# Patient Record
Sex: MALE | Race: WHITE | NOT HISPANIC OR LATINO | Employment: FULL TIME | URBAN - METROPOLITAN AREA
[De-identification: names, ages, dates, MRNs, and addresses within clinical notes are randomized per-mention and may not be internally consistent; named-entity substitution may affect disease eponyms.]

---

## 2017-06-29 ENCOUNTER — APPOINTMENT (OUTPATIENT)
Dept: PREADMISSION TESTING | Facility: HOSPITAL | Age: 62
End: 2017-06-29

## 2017-06-29 ENCOUNTER — HOSPITAL ENCOUNTER (OUTPATIENT)
Dept: GENERAL RADIOLOGY | Facility: HOSPITAL | Age: 62
Discharge: HOME | End: 2017-06-29
Admitting: ORTHOPAEDIC SURGERY

## 2017-06-29 ENCOUNTER — HOSPITAL ENCOUNTER (OUTPATIENT)
Dept: GENERAL RADIOLOGY | Facility: HOSPITAL | Age: 62
Discharge: HOME | End: 2017-06-29

## 2017-06-29 VITALS
HEIGHT: 71 IN | DIASTOLIC BLOOD PRESSURE: 88 MMHG | SYSTOLIC BLOOD PRESSURE: 141 MMHG | BODY MASS INDEX: 34.72 KG/M2 | WEIGHT: 248 LBS | OXYGEN SATURATION: 96 % | HEART RATE: 67 BPM | RESPIRATION RATE: 16 BRPM | TEMPERATURE: 97.2 F

## 2017-06-29 LAB
ALBUMIN SERPL-MCNC: 4.5 G/DL (ref 3.5–5.2)
ALBUMIN/GLOB SERPL: 1.5 G/DL
ALP SERPL-CCNC: 51 U/L (ref 39–117)
ALT SERPL W P-5'-P-CCNC: 52 U/L (ref 1–41)
ANION GAP SERPL CALCULATED.3IONS-SCNC: 13.3 MMOL/L
APTT PPP: 27.4 SECONDS (ref 22.7–35.4)
AST SERPL-CCNC: 36 U/L (ref 1–40)
BASOPHILS # BLD AUTO: 0.03 10*3/MM3 (ref 0–0.2)
BASOPHILS NFR BLD AUTO: 0.5 % (ref 0–1.5)
BILIRUB SERPL-MCNC: 0.4 MG/DL (ref 0.1–1.2)
BILIRUB UR QL STRIP: NEGATIVE
BUN BLD-MCNC: 16 MG/DL (ref 6–20)
BUN/CREAT SERPL: 17 (ref 7–25)
CALCIUM SPEC-SCNC: 10.5 MG/DL (ref 8.6–10.5)
CHLORIDE SERPL-SCNC: 101 MMOL/L (ref 98–107)
CLARITY UR: CLEAR
CO2 SERPL-SCNC: 25.7 MMOL/L (ref 22–29)
COLOR UR: YELLOW
CREAT BLD-MCNC: 0.94 MG/DL (ref 0.76–1.27)
DEPRECATED RDW RBC AUTO: 45.9 FL (ref 37–54)
EOSINOPHIL # BLD AUTO: 0.11 10*3/MM3 (ref 0–0.7)
EOSINOPHIL NFR BLD AUTO: 1.8 % (ref 0.3–6.2)
ERYTHROCYTE [DISTWIDTH] IN BLOOD BY AUTOMATED COUNT: 13.6 % (ref 11.5–14.5)
GFR SERPL CREATININE-BSD FRML MDRD: 82 ML/MIN/1.73
GLOBULIN UR ELPH-MCNC: 3 GM/DL
GLUCOSE BLD-MCNC: 105 MG/DL (ref 65–99)
GLUCOSE UR STRIP-MCNC: NEGATIVE MG/DL
HCT VFR BLD AUTO: 42.1 % (ref 40.4–52.2)
HGB BLD-MCNC: 14.2 G/DL (ref 13.7–17.6)
HGB UR QL STRIP.AUTO: NEGATIVE
IMM GRANULOCYTES # BLD: 0.02 10*3/MM3 (ref 0–0.03)
IMM GRANULOCYTES NFR BLD: 0.3 % (ref 0–0.5)
INR PPP: 0.94 (ref 0.9–1.1)
KETONES UR QL STRIP: NEGATIVE
LEUKOCYTE ESTERASE UR QL STRIP.AUTO: NEGATIVE
LYMPHOCYTES # BLD AUTO: 1.59 10*3/MM3 (ref 0.9–4.8)
LYMPHOCYTES NFR BLD AUTO: 26.4 % (ref 19.6–45.3)
MCH RBC QN AUTO: 31.8 PG (ref 27–32.7)
MCHC RBC AUTO-ENTMCNC: 33.7 G/DL (ref 32.6–36.4)
MCV RBC AUTO: 94.2 FL (ref 79.8–96.2)
MONOCYTES # BLD AUTO: 0.65 10*3/MM3 (ref 0.2–1.2)
MONOCYTES NFR BLD AUTO: 10.8 % (ref 5–12)
NEUTROPHILS # BLD AUTO: 3.62 10*3/MM3 (ref 1.9–8.1)
NEUTROPHILS NFR BLD AUTO: 60.2 % (ref 42.7–76)
NITRITE UR QL STRIP: NEGATIVE
PH UR STRIP.AUTO: 7 [PH] (ref 5–8)
PLATELET # BLD AUTO: 233 10*3/MM3 (ref 140–500)
PMV BLD AUTO: 9.5 FL (ref 6–12)
POTASSIUM BLD-SCNC: 4.3 MMOL/L (ref 3.5–5.2)
PROT SERPL-MCNC: 7.5 G/DL (ref 6–8.5)
PROT UR QL STRIP: NEGATIVE
PROTHROMBIN TIME: 12.2 SECONDS (ref 11.7–14.2)
RBC # BLD AUTO: 4.47 10*6/MM3 (ref 4.6–6)
SODIUM BLD-SCNC: 140 MMOL/L (ref 136–145)
SP GR UR STRIP: 1.01 (ref 1–1.03)
UROBILINOGEN UR QL STRIP: NORMAL
WBC NRBC COR # BLD: 6.02 10*3/MM3 (ref 4.5–10.7)

## 2017-06-29 PROCEDURE — 93010 ELECTROCARDIOGRAM REPORT: CPT | Performed by: INTERNAL MEDICINE

## 2017-06-29 PROCEDURE — 85610 PROTHROMBIN TIME: CPT | Performed by: ORTHOPAEDIC SURGERY

## 2017-06-29 PROCEDURE — 73560 X-RAY EXAM OF KNEE 1 OR 2: CPT

## 2017-06-29 PROCEDURE — 85730 THROMBOPLASTIN TIME PARTIAL: CPT | Performed by: ORTHOPAEDIC SURGERY

## 2017-06-29 PROCEDURE — 85025 COMPLETE CBC W/AUTO DIFF WBC: CPT | Performed by: ORTHOPAEDIC SURGERY

## 2017-06-29 PROCEDURE — 80053 COMPREHEN METABOLIC PANEL: CPT | Performed by: ORTHOPAEDIC SURGERY

## 2017-06-29 PROCEDURE — 71020 HC CHEST PA AND LATERAL: CPT

## 2017-06-29 PROCEDURE — 93005 ELECTROCARDIOGRAM TRACING: CPT

## 2017-06-29 PROCEDURE — 81003 URINALYSIS AUTO W/O SCOPE: CPT | Performed by: ORTHOPAEDIC SURGERY

## 2017-06-29 PROCEDURE — 36415 COLL VENOUS BLD VENIPUNCTURE: CPT

## 2017-06-29 RX ORDER — OMEPRAZOLE 20 MG/1
20 CAPSULE, DELAYED RELEASE ORAL AS NEEDED
COMMUNITY

## 2017-06-29 RX ORDER — AMLODIPINE BESYLATE 5 MG/1
5 TABLET ORAL EVERY MORNING
COMMUNITY

## 2017-06-29 RX ORDER — CHLORHEXIDINE GLUCONATE 500 MG/1
CLOTH TOPICAL
Status: ON HOLD | COMMUNITY
End: 2017-07-13

## 2017-06-29 RX ORDER — ASPIRIN 81 MG/1
81 TABLET ORAL DAILY
COMMUNITY
End: 2017-07-14 | Stop reason: HOSPADM

## 2017-06-29 RX ORDER — LISINOPRIL AND HYDROCHLOROTHIAZIDE 20; 12.5 MG/1; MG/1
1 TABLET ORAL EVERY MORNING
COMMUNITY

## 2017-06-29 RX ORDER — ALLOPURINOL 300 MG/1
300 TABLET ORAL DAILY
COMMUNITY

## 2017-06-29 RX ORDER — DUTASTERIDE 0.5 MG/1
0.5 CAPSULE, LIQUID FILLED ORAL NIGHTLY
COMMUNITY

## 2017-06-29 RX ORDER — FEXOFENADINE HCL 180 MG/1
180 TABLET ORAL DAILY
COMMUNITY

## 2017-07-03 NOTE — PROGRESS NOTES
Pre-Operative Orthopedic Assessment      Patient: Kaleigh Rizvi    YOB: 1955      Age/Gender: 61 y.o. male  Medical Record Number: [id number]  Surgical Procedure Planned: DE TOTAL KNEE ARTHROPLASTY [[id number]] (TOTAL KNEE ARTHROPLASTY)     Surgeon: Maikel Doshi MD    Date of Surgery Planned: 7/13/2017    Question Value Score    Patient Score   1. What is your age group? 50-65 years  66-75 years  >75 years =2  =1  =0 2   2. Gender? Male  Female =2  =1 2   3. How far on average can you walk? (a block is 200 meters) Two blocks or more (+\- rest)  1-2 blocks (+\- rest)  Housebound (most of time) =2  =1  =0 2   4. Which gait aid to you use? (more often than not) None  Single-Point Stick  Crutches/Frame =2  =1  =0 2   5. Do you use community  supports? (home help, meals on wheels, district nursing) None or one per week  Two or more per week =1  =0 1   6. Will you live with someone who can care for you after your operation? Yes  No =3  =0 3      Your Score (out of 12)  12     Key Destination at Discharge from acute care predicted by score   Scores < 6  -- extended inpatient rehabilitation   Scores 6-9 -- additional intervention to discharge directly home (Rehabilitation in home)   Scores > 9 -- directly home         Discharge Disposition/Planning:     Patient Response   Discussed the Predicted discharge disposition needed based on RAPT Assessment with the patient.    yes   Patient selected discharge disposition:   home   Out Patient Rehabilitation Facility of Choice:    n/a   Home Health Services Preferred:   undecided   Has the patient completed or been scheduled to complete pre-operative testing? Completed on 6/29/17   Post-Operative Care Giver Name and Phone Number:    Isi (wife) home [phone], cell [phone]     Subacute Inpatient Rehabilitation:  Complete this section only if planning inpatient services at a Subacute Facility     Patient Response   Subacute Facility  Preferred (Please list 2 facilities:   n/a   Requires pre-certification for inpatient rehabilitation services?      n/a   Planned source of transportation to inpatient rehabilitation facility?   n/a    If choosing inpatient services at an Acute or Subacute Facility please list a subsequent back-up plan (in case patient fails to qualify for inpatient rehabilitation). Back-up plans should include caregiver (family member or friend) for first 24-48 post- -operatively.    n/a   Home Equipment Patient Response   Does patient have a walker for home use?    no   Does patient have a 3 in 1 commode for home use?    no   Does patient have a shower chair for home use?    Has a walk in shower with a built in seat   Does patient have an elevated commode seat for home use? no   Does patient have a cane for home use?    no   Is there any other medical equipment in the home? If so,  List in comment section below no   Pre-Operative Class Attendance Patient Response   Attended or scheduled to attend the pre-operative class within 1 year of total joint replacement? Attended day of PAT   Not planning to attend the pre-operative class    n/a   Has attended the pre-operative class > 1 year ago    n/a   Does not want to attend the class    n/a   Was misinformed that did not need to attend the class    n/a   Class time is not convenient    n/a   Other reasons for refusing to attend the pre-operative class (if yes, see comments below)   n/a   Patient Education  Completed   Expected time of discharge discussed yes   Encouraged to attend Pre-Operative Class    attended   Education re: Back-up plan for patients planning discharge to subacute facilities n/a   Education re: Predicted Discharge Disposition based on RAPT score    yes   Patient receptive and voiced understanding of information given    yes                                                                                                            Comments:    Spoke with patient  Kaleigh Rizvi at [phone].  Verified home address to [place] correct [facility] [address] [facility] [id number] [place] [address].   Lives with wife Isi in a single level house with a basement (railing).  Everything that patient needs is on the main level.  3 or 4 steps to the entry with railing.  Stated that he isn't currently using any assistive devices.  Plans to return back home at discharge with assist of his wife and home health (need to follow up with patient on his home health agency preference). Roxanne Lowe RN                                           Signature: Roxanne Lowe RN    Date:  7/3/2017

## 2017-07-13 ENCOUNTER — ANESTHESIA (OUTPATIENT)
Dept: PERIOP | Facility: HOSPITAL | Age: 62
End: 2017-07-13

## 2017-07-13 ENCOUNTER — HOSPITAL ENCOUNTER (INPATIENT)
Facility: HOSPITAL | Age: 62
LOS: 1 days | Discharge: HOME HEALTH | End: 2017-07-14
Attending: ORTHOPAEDIC SURGERY | Admitting: ORTHOPAEDIC SURGERY

## 2017-07-13 ENCOUNTER — ANESTHESIA EVENT (OUTPATIENT)
Dept: PERIOP | Facility: HOSPITAL | Age: 62
End: 2017-07-13

## 2017-07-13 ENCOUNTER — APPOINTMENT (OUTPATIENT)
Dept: GENERAL RADIOLOGY | Facility: HOSPITAL | Age: 62
End: 2017-07-13

## 2017-07-13 DIAGNOSIS — R26.2 DIFFICULTY WALKING: Primary | ICD-10-CM

## 2017-07-13 PROBLEM — M17.9 OSTEOARTHRITIS, KNEE: Status: ACTIVE | Noted: 2017-07-13

## 2017-07-13 PROCEDURE — 25010000002 DEXAMETHASONE PER 1 MG: Performed by: NURSE ANESTHETIST, CERTIFIED REGISTERED

## 2017-07-13 PROCEDURE — 97162 PT EVAL MOD COMPLEX 30 MIN: CPT

## 2017-07-13 PROCEDURE — 25010000003 CEFAZOLIN IN DEXTROSE 2-4 GM/100ML-% SOLUTION: Performed by: ORTHOPAEDIC SURGERY

## 2017-07-13 PROCEDURE — 25010000002 KETOROLAC TROMETHAMINE PER 15 MG: Performed by: ORTHOPAEDIC SURGERY

## 2017-07-13 PROCEDURE — C1713 ANCHOR/SCREW BN/BN,TIS/BN: HCPCS | Performed by: ORTHOPAEDIC SURGERY

## 2017-07-13 PROCEDURE — 25010000002 ONDANSETRON PER 1 MG: Performed by: NURSE ANESTHETIST, CERTIFIED REGISTERED

## 2017-07-13 PROCEDURE — 25010000002 HYDROMORPHONE PER 4 MG: Performed by: NURSE ANESTHETIST, CERTIFIED REGISTERED

## 2017-07-13 PROCEDURE — C1776 JOINT DEVICE (IMPLANTABLE): HCPCS | Performed by: ORTHOPAEDIC SURGERY

## 2017-07-13 PROCEDURE — 97110 THERAPEUTIC EXERCISES: CPT

## 2017-07-13 PROCEDURE — 25010000002 FENTANYL CITRATE (PF) 100 MCG/2ML SOLUTION: Performed by: NURSE ANESTHETIST, CERTIFIED REGISTERED

## 2017-07-13 PROCEDURE — 25010000002 MIDAZOLAM PER 1 MG: Performed by: ANESTHESIOLOGY

## 2017-07-13 PROCEDURE — 25010000002 KETOROLAC TROMETHAMINE PER 15 MG: Performed by: NURSE ANESTHETIST, CERTIFIED REGISTERED

## 2017-07-13 PROCEDURE — 0SRD0J9 REPLACEMENT OF LEFT KNEE JOINT WITH SYNTHETIC SUBSTITUTE, CEMENTED, OPEN APPROACH: ICD-10-PCS | Performed by: ORTHOPAEDIC SURGERY

## 2017-07-13 PROCEDURE — 73560 X-RAY EXAM OF KNEE 1 OR 2: CPT

## 2017-07-13 PROCEDURE — 25010000003 CEFAZOLIN IN DEXTROSE 2-4 GM/100ML-% SOLUTION: Performed by: NURSE ANESTHETIST, CERTIFIED REGISTERED

## 2017-07-13 PROCEDURE — 25010000002 VANCOMYCIN PER 500 MG: Performed by: ORTHOPAEDIC SURGERY

## 2017-07-13 PROCEDURE — 25010000002 PROPOFOL 10 MG/ML EMULSION: Performed by: NURSE ANESTHETIST, CERTIFIED REGISTERED

## 2017-07-13 DEVICE — COMP FEM/KN PERSONA CR CMT COCR STD SZ9 RT: Type: IMPLANTABLE DEVICE | Status: FUNCTIONAL

## 2017-07-13 DEVICE — CMT BONE PALACOS 120001: Type: IMPLANTABLE DEVICE | Site: KNEE | Status: FUNCTIONAL

## 2017-07-13 DEVICE — ART/SRF KN PERSONA/VE MC GH 8TO11 10MM RT: Type: IMPLANTABLE DEVICE | Status: FUNCTIONAL

## 2017-07-13 DEVICE — EXT STEM FEM/KN PERSONA TPR 14XPLS30MM: Type: IMPLANTABLE DEVICE | Status: FUNCTIONAL

## 2017-07-13 DEVICE — IMPLANTABLE DEVICE: Type: IMPLANTABLE DEVICE | Status: FUNCTIONAL

## 2017-07-13 DEVICE — STEM TIB/KN PERSONA CMT 5D SZG RT: Type: IMPLANTABLE DEVICE | Status: FUNCTIONAL

## 2017-07-13 DEVICE — TOTL KN FM CEM VE ZIMMER: Type: IMPLANTABLE DEVICE | Status: FUNCTIONAL

## 2017-07-13 RX ORDER — ONDANSETRON 2 MG/ML
4 INJECTION INTRAMUSCULAR; INTRAVENOUS ONCE AS NEEDED
Status: DISCONTINUED | OUTPATIENT
Start: 2017-07-13 | End: 2017-07-13 | Stop reason: HOSPADM

## 2017-07-13 RX ORDER — LABETALOL HYDROCHLORIDE 5 MG/ML
5 INJECTION, SOLUTION INTRAVENOUS
Status: DISCONTINUED | OUTPATIENT
Start: 2017-07-13 | End: 2017-07-13 | Stop reason: HOSPADM

## 2017-07-13 RX ORDER — NALOXONE HCL 0.4 MG/ML
0.2 VIAL (ML) INJECTION AS NEEDED
Status: DISCONTINUED | OUTPATIENT
Start: 2017-07-13 | End: 2017-07-13 | Stop reason: HOSPADM

## 2017-07-13 RX ORDER — ALBUTEROL SULFATE 2.5 MG/3ML
2.5 SOLUTION RESPIRATORY (INHALATION) ONCE AS NEEDED
Status: DISCONTINUED | OUTPATIENT
Start: 2017-07-13 | End: 2017-07-13 | Stop reason: HOSPADM

## 2017-07-13 RX ORDER — HYDROMORPHONE HYDROCHLORIDE 1 MG/ML
0.5 INJECTION, SOLUTION INTRAMUSCULAR; INTRAVENOUS; SUBCUTANEOUS
Status: DISCONTINUED | OUTPATIENT
Start: 2017-07-13 | End: 2017-07-13 | Stop reason: HOSPADM

## 2017-07-13 RX ORDER — ALLOPURINOL 300 MG/1
300 TABLET ORAL DAILY
Status: DISCONTINUED | OUTPATIENT
Start: 2017-07-13 | End: 2017-07-14 | Stop reason: HOSPADM

## 2017-07-13 RX ORDER — KETOROLAC TROMETHAMINE 30 MG/ML
30 INJECTION, SOLUTION INTRAMUSCULAR; INTRAVENOUS EVERY 6 HOURS
Status: DISCONTINUED | OUTPATIENT
Start: 2017-07-13 | End: 2017-07-14 | Stop reason: HOSPADM

## 2017-07-13 RX ORDER — ACETAMINOPHEN 325 MG/1
650 TABLET ORAL ONCE AS NEEDED
Status: DISCONTINUED | OUTPATIENT
Start: 2017-07-13 | End: 2017-07-13 | Stop reason: HOSPADM

## 2017-07-13 RX ORDER — BISACODYL 10 MG
10 SUPPOSITORY, RECTAL RECTAL DAILY PRN
Status: DISCONTINUED | OUTPATIENT
Start: 2017-07-13 | End: 2017-07-14 | Stop reason: HOSPADM

## 2017-07-13 RX ORDER — DIPHENHYDRAMINE HCL 25 MG
25 CAPSULE ORAL EVERY 6 HOURS PRN
Status: DISCONTINUED | OUTPATIENT
Start: 2017-07-13 | End: 2017-07-14 | Stop reason: HOSPADM

## 2017-07-13 RX ORDER — ONDANSETRON 4 MG/1
4 TABLET, FILM COATED ORAL EVERY 6 HOURS PRN
Status: DISCONTINUED | OUTPATIENT
Start: 2017-07-13 | End: 2017-07-14 | Stop reason: HOSPADM

## 2017-07-13 RX ORDER — PROMETHAZINE HYDROCHLORIDE 25 MG/ML
12.5 INJECTION, SOLUTION INTRAMUSCULAR; INTRAVENOUS ONCE AS NEEDED
Status: DISCONTINUED | OUTPATIENT
Start: 2017-07-13 | End: 2017-07-13 | Stop reason: HOSPADM

## 2017-07-13 RX ORDER — PROPOFOL 10 MG/ML
VIAL (ML) INTRAVENOUS AS NEEDED
Status: DISCONTINUED | OUTPATIENT
Start: 2017-07-13 | End: 2017-07-13 | Stop reason: SURG

## 2017-07-13 RX ORDER — ACETAMINOPHEN 325 MG/1
650 TABLET ORAL EVERY 4 HOURS PRN
Status: DISCONTINUED | OUTPATIENT
Start: 2017-07-13 | End: 2017-07-14 | Stop reason: HOSPADM

## 2017-07-13 RX ORDER — OXYCODONE HYDROCHLORIDE AND ACETAMINOPHEN 5; 325 MG/1; MG/1
1 TABLET ORAL EVERY 4 HOURS PRN
Status: DISCONTINUED | OUTPATIENT
Start: 2017-07-13 | End: 2017-07-14 | Stop reason: HOSPADM

## 2017-07-13 RX ORDER — MAGNESIUM HYDROXIDE 1200 MG/15ML
LIQUID ORAL AS NEEDED
Status: DISCONTINUED | OUTPATIENT
Start: 2017-07-13 | End: 2017-07-13 | Stop reason: HOSPADM

## 2017-07-13 RX ORDER — LISINOPRIL AND HYDROCHLOROTHIAZIDE 20; 12.5 MG/1; MG/1
1 TABLET ORAL EVERY MORNING
Status: DISCONTINUED | OUTPATIENT
Start: 2017-07-13 | End: 2017-07-14 | Stop reason: HOSPADM

## 2017-07-13 RX ORDER — ACETAMINOPHEN 325 MG/1
325 TABLET ORAL EVERY 4 HOURS PRN
Status: DISCONTINUED | OUTPATIENT
Start: 2017-07-13 | End: 2017-07-14 | Stop reason: HOSPADM

## 2017-07-13 RX ORDER — FENTANYL CITRATE 50 UG/ML
50 INJECTION, SOLUTION INTRAMUSCULAR; INTRAVENOUS
Status: DISCONTINUED | OUTPATIENT
Start: 2017-07-13 | End: 2017-07-13 | Stop reason: HOSPADM

## 2017-07-13 RX ORDER — CEFAZOLIN SODIUM 2 G/100ML
2 INJECTION, SOLUTION INTRAVENOUS EVERY 8 HOURS
Status: COMPLETED | OUTPATIENT
Start: 2017-07-13 | End: 2017-07-14

## 2017-07-13 RX ORDER — AMLODIPINE BESYLATE 5 MG/1
5 TABLET ORAL EVERY MORNING
Status: DISCONTINUED | OUTPATIENT
Start: 2017-07-14 | End: 2017-07-14 | Stop reason: HOSPADM

## 2017-07-13 RX ORDER — ONDANSETRON 2 MG/ML
INJECTION INTRAMUSCULAR; INTRAVENOUS AS NEEDED
Status: DISCONTINUED | OUTPATIENT
Start: 2017-07-13 | End: 2017-07-13 | Stop reason: SURG

## 2017-07-13 RX ORDER — FENTANYL CITRATE 50 UG/ML
INJECTION, SOLUTION INTRAMUSCULAR; INTRAVENOUS AS NEEDED
Status: DISCONTINUED | OUTPATIENT
Start: 2017-07-13 | End: 2017-07-13 | Stop reason: SURG

## 2017-07-13 RX ORDER — FLUMAZENIL 0.1 MG/ML
0.2 INJECTION INTRAVENOUS AS NEEDED
Status: DISCONTINUED | OUTPATIENT
Start: 2017-07-13 | End: 2017-07-13 | Stop reason: HOSPADM

## 2017-07-13 RX ORDER — FERROUS SULFATE 325(65) MG
325 TABLET ORAL
Status: DISCONTINUED | OUTPATIENT
Start: 2017-07-14 | End: 2017-07-14 | Stop reason: HOSPADM

## 2017-07-13 RX ORDER — PROMETHAZINE HYDROCHLORIDE 25 MG/1
25 TABLET ORAL ONCE AS NEEDED
Status: DISCONTINUED | OUTPATIENT
Start: 2017-07-13 | End: 2017-07-13 | Stop reason: HOSPADM

## 2017-07-13 RX ORDER — MIDAZOLAM HYDROCHLORIDE 1 MG/ML
2 INJECTION INTRAMUSCULAR; INTRAVENOUS
Status: DISCONTINUED | OUTPATIENT
Start: 2017-07-13 | End: 2017-07-13 | Stop reason: HOSPADM

## 2017-07-13 RX ORDER — MIDAZOLAM HYDROCHLORIDE 1 MG/ML
1 INJECTION INTRAMUSCULAR; INTRAVENOUS
Status: DISCONTINUED | OUTPATIENT
Start: 2017-07-13 | End: 2017-07-13 | Stop reason: HOSPADM

## 2017-07-13 RX ORDER — NALOXONE HCL 0.4 MG/ML
0.1 VIAL (ML) INJECTION
Status: DISCONTINUED | OUTPATIENT
Start: 2017-07-13 | End: 2017-07-14 | Stop reason: HOSPADM

## 2017-07-13 RX ORDER — FAMOTIDINE 10 MG/ML
20 INJECTION, SOLUTION INTRAVENOUS ONCE
Status: COMPLETED | OUTPATIENT
Start: 2017-07-13 | End: 2017-07-13

## 2017-07-13 RX ORDER — KETOROLAC TROMETHAMINE 30 MG/ML
INJECTION, SOLUTION INTRAMUSCULAR; INTRAVENOUS AS NEEDED
Status: DISCONTINUED | OUTPATIENT
Start: 2017-07-13 | End: 2017-07-13 | Stop reason: SURG

## 2017-07-13 RX ORDER — HYDRALAZINE HYDROCHLORIDE 20 MG/ML
5 INJECTION INTRAMUSCULAR; INTRAVENOUS
Status: DISCONTINUED | OUTPATIENT
Start: 2017-07-13 | End: 2017-07-13 | Stop reason: HOSPADM

## 2017-07-13 RX ORDER — LIDOCAINE HYDROCHLORIDE 20 MG/ML
INJECTION, SOLUTION INFILTRATION; PERINEURAL AS NEEDED
Status: DISCONTINUED | OUTPATIENT
Start: 2017-07-13 | End: 2017-07-13 | Stop reason: SURG

## 2017-07-13 RX ORDER — PROMETHAZINE HYDROCHLORIDE 25 MG/1
25 SUPPOSITORY RECTAL ONCE AS NEEDED
Status: DISCONTINUED | OUTPATIENT
Start: 2017-07-13 | End: 2017-07-13 | Stop reason: HOSPADM

## 2017-07-13 RX ORDER — PANTOPRAZOLE SODIUM 40 MG/1
40 TABLET, DELAYED RELEASE ORAL EVERY MORNING
Status: DISCONTINUED | OUTPATIENT
Start: 2017-07-14 | End: 2017-07-14 | Stop reason: HOSPADM

## 2017-07-13 RX ORDER — SODIUM CHLORIDE, SODIUM LACTATE, POTASSIUM CHLORIDE, CALCIUM CHLORIDE 600; 310; 30; 20 MG/100ML; MG/100ML; MG/100ML; MG/100ML
9 INJECTION, SOLUTION INTRAVENOUS CONTINUOUS
Status: DISCONTINUED | OUTPATIENT
Start: 2017-07-13 | End: 2017-07-14 | Stop reason: HOSPADM

## 2017-07-13 RX ORDER — OXYCODONE HYDROCHLORIDE AND ACETAMINOPHEN 5; 325 MG/1; MG/1
2 TABLET ORAL EVERY 4 HOURS PRN
Status: DISCONTINUED | OUTPATIENT
Start: 2017-07-13 | End: 2017-07-14 | Stop reason: HOSPADM

## 2017-07-13 RX ORDER — BISACODYL 5 MG/1
10 TABLET, DELAYED RELEASE ORAL DAILY PRN
Status: DISCONTINUED | OUTPATIENT
Start: 2017-07-13 | End: 2017-07-14 | Stop reason: HOSPADM

## 2017-07-13 RX ORDER — DOCUSATE SODIUM 100 MG/1
100 CAPSULE, LIQUID FILLED ORAL 2 TIMES DAILY PRN
Status: DISCONTINUED | OUTPATIENT
Start: 2017-07-13 | End: 2017-07-14 | Stop reason: HOSPADM

## 2017-07-13 RX ORDER — SODIUM CHLORIDE 0.9 % (FLUSH) 0.9 %
1-10 SYRINGE (ML) INJECTION AS NEEDED
Status: DISCONTINUED | OUTPATIENT
Start: 2017-07-13 | End: 2017-07-14 | Stop reason: HOSPADM

## 2017-07-13 RX ORDER — SODIUM CHLORIDE 0.9 % (FLUSH) 0.9 %
1-10 SYRINGE (ML) INJECTION AS NEEDED
Status: DISCONTINUED | OUTPATIENT
Start: 2017-07-13 | End: 2017-07-13 | Stop reason: HOSPADM

## 2017-07-13 RX ORDER — ONDANSETRON 4 MG/1
4 TABLET, ORALLY DISINTEGRATING ORAL EVERY 6 HOURS PRN
Status: DISCONTINUED | OUTPATIENT
Start: 2017-07-13 | End: 2017-07-14 | Stop reason: HOSPADM

## 2017-07-13 RX ORDER — ASPIRIN 325 MG
325 TABLET, DELAYED RELEASE (ENTERIC COATED) ORAL DAILY
Status: DISCONTINUED | OUTPATIENT
Start: 2017-07-13 | End: 2017-07-14 | Stop reason: HOSPADM

## 2017-07-13 RX ORDER — CEFAZOLIN SODIUM 2 G/100ML
INJECTION, SOLUTION INTRAVENOUS AS NEEDED
Status: DISCONTINUED | OUTPATIENT
Start: 2017-07-13 | End: 2017-07-13 | Stop reason: SURG

## 2017-07-13 RX ORDER — DEXAMETHASONE SODIUM PHOSPHATE 10 MG/ML
INJECTION INTRAMUSCULAR; INTRAVENOUS AS NEEDED
Status: DISCONTINUED | OUTPATIENT
Start: 2017-07-13 | End: 2017-07-13 | Stop reason: SURG

## 2017-07-13 RX ORDER — CETIRIZINE HYDROCHLORIDE 10 MG/1
10 TABLET ORAL DAILY
Status: DISCONTINUED | OUTPATIENT
Start: 2017-07-13 | End: 2017-07-14 | Stop reason: HOSPADM

## 2017-07-13 RX ORDER — TRANEXAMIC ACID 100 MG/ML
INJECTION, SOLUTION INTRAVENOUS AS NEEDED
Status: DISCONTINUED | OUTPATIENT
Start: 2017-07-13 | End: 2017-07-13 | Stop reason: SURG

## 2017-07-13 RX ORDER — ONDANSETRON 2 MG/ML
4 INJECTION INTRAMUSCULAR; INTRAVENOUS EVERY 6 HOURS PRN
Status: DISCONTINUED | OUTPATIENT
Start: 2017-07-13 | End: 2017-07-14 | Stop reason: HOSPADM

## 2017-07-13 RX ORDER — HYDROMORPHONE HCL 110MG/55ML
PATIENT CONTROLLED ANALGESIA SYRINGE INTRAVENOUS AS NEEDED
Status: DISCONTINUED | OUTPATIENT
Start: 2017-07-13 | End: 2017-07-13 | Stop reason: SURG

## 2017-07-13 RX ORDER — ACETAMINOPHEN 500 MG
1000 TABLET ORAL ONCE
Status: COMPLETED | OUTPATIENT
Start: 2017-07-13 | End: 2017-07-13

## 2017-07-13 RX ORDER — SODIUM CHLORIDE, SODIUM LACTATE, POTASSIUM CHLORIDE, CALCIUM CHLORIDE 600; 310; 30; 20 MG/100ML; MG/100ML; MG/100ML; MG/100ML
100 INJECTION, SOLUTION INTRAVENOUS CONTINUOUS
Status: DISCONTINUED | OUTPATIENT
Start: 2017-07-13 | End: 2017-07-14 | Stop reason: HOSPADM

## 2017-07-13 RX ADMIN — SODIUM CHLORIDE, POTASSIUM CHLORIDE, SODIUM LACTATE AND CALCIUM CHLORIDE 100 ML/HR: 600; 310; 30; 20 INJECTION, SOLUTION INTRAVENOUS at 17:20

## 2017-07-13 RX ADMIN — OXYCODONE HYDROCHLORIDE AND ACETAMINOPHEN 2 TABLET: 5; 325 TABLET ORAL at 16:20

## 2017-07-13 RX ADMIN — DEXAMETHASONE SODIUM PHOSPHATE 6 MG: 10 INJECTION INTRAMUSCULAR; INTRAVENOUS at 10:10

## 2017-07-13 RX ADMIN — PROPOFOL 200 MG: 10 INJECTION, EMULSION INTRAVENOUS at 10:00

## 2017-07-13 RX ADMIN — CEFAZOLIN SODIUM 2 G: 2 INJECTION, SOLUTION INTRAVENOUS at 11:15

## 2017-07-13 RX ADMIN — ASPIRIN 325 MG: 325 TABLET, DELAYED RELEASE ORAL at 17:20

## 2017-07-13 RX ADMIN — KETOROLAC TROMETHAMINE 30 MG: 30 INJECTION, SOLUTION INTRAMUSCULAR at 20:55

## 2017-07-13 RX ADMIN — FENTANYL CITRATE 50 MCG: 50 INJECTION INTRAMUSCULAR; INTRAVENOUS at 10:30

## 2017-07-13 RX ADMIN — DOCUSATE SODIUM 100 MG: 100 CAPSULE, LIQUID FILLED ORAL at 16:20

## 2017-07-13 RX ADMIN — KETOROLAC TROMETHAMINE 30 MG: 30 INJECTION, SOLUTION INTRAMUSCULAR; INTRAVENOUS at 11:22

## 2017-07-13 RX ADMIN — FENTANYL CITRATE 50 MCG: 50 INJECTION INTRAMUSCULAR; INTRAVENOUS at 09:56

## 2017-07-13 RX ADMIN — TRANEXAMIC ACID 1000 MG: 100 INJECTION, SOLUTION INTRAVENOUS at 11:00

## 2017-07-13 RX ADMIN — VANCOMYCIN HYDROCHLORIDE 2000 MG: 1 INJECTION, POWDER, LYOPHILIZED, FOR SOLUTION INTRAVENOUS at 08:00

## 2017-07-13 RX ADMIN — FENTANYL CITRATE 50 MCG: 50 INJECTION INTRAMUSCULAR; INTRAVENOUS at 10:21

## 2017-07-13 RX ADMIN — ONDANSETRON 4 MG: 2 INJECTION INTRAMUSCULAR; INTRAVENOUS at 11:22

## 2017-07-13 RX ADMIN — SODIUM CHLORIDE, POTASSIUM CHLORIDE, SODIUM LACTATE AND CALCIUM CHLORIDE 9 ML/HR: 600; 310; 30; 20 INJECTION, SOLUTION INTRAVENOUS at 08:38

## 2017-07-13 RX ADMIN — MIDAZOLAM 2 MG: 1 INJECTION INTRAMUSCULAR; INTRAVENOUS at 08:38

## 2017-07-13 RX ADMIN — OXYCODONE HYDROCHLORIDE AND ACETAMINOPHEN 2 TABLET: 5; 325 TABLET ORAL at 20:55

## 2017-07-13 RX ADMIN — FAMOTIDINE 20 MG: 10 INJECTION INTRAVENOUS at 08:38

## 2017-07-13 RX ADMIN — CEFAZOLIN SODIUM 2 G: 2 INJECTION, SOLUTION INTRAVENOUS at 19:22

## 2017-07-13 RX ADMIN — SODIUM CHLORIDE, POTASSIUM CHLORIDE, SODIUM LACTATE AND CALCIUM CHLORIDE: 600; 310; 30; 20 INJECTION, SOLUTION INTRAVENOUS at 11:05

## 2017-07-13 RX ADMIN — HYDROMORPHONE HYDROCHLORIDE 0.5 MG: 2 INJECTION, SOLUTION INTRAMUSCULAR; INTRAVENOUS; SUBCUTANEOUS at 10:27

## 2017-07-13 RX ADMIN — LIDOCAINE HYDROCHLORIDE 60 MG: 20 INJECTION, SOLUTION INFILTRATION; PERINEURAL at 10:00

## 2017-07-13 RX ADMIN — HYDROMORPHONE HYDROCHLORIDE 0.5 MG: 2 INJECTION, SOLUTION INTRAMUSCULAR; INTRAVENOUS; SUBCUTANEOUS at 11:03

## 2017-07-13 RX ADMIN — ALLOPURINOL 300 MG: 300 TABLET ORAL at 17:20

## 2017-07-13 RX ADMIN — ACETAMINOPHEN 1000 MG: 500 TABLET ORAL at 07:56

## 2017-07-13 RX ADMIN — HYDROMORPHONE HYDROCHLORIDE 0.5 MG: 1 INJECTION, SOLUTION INTRAMUSCULAR; INTRAVENOUS; SUBCUTANEOUS at 12:08

## 2017-07-13 RX ADMIN — HYDROMORPHONE HYDROCHLORIDE 0.5 MG: 1 INJECTION, SOLUTION INTRAMUSCULAR; INTRAVENOUS; SUBCUTANEOUS at 13:40

## 2017-07-13 NOTE — PLAN OF CARE
Problem: Patient Care Overview (Adult)  Goal: Plan of Care Review  Outcome: Ongoing (interventions implemented as appropriate)    07/13/17 1643   Coping/Psychosocial Response Interventions   Plan Of Care Reviewed With patient   Patient Care Overview   Progress improving   Outcome Evaluation   Outcome Summary/Follow up Plan Pt doing well day of OR. Voided amvulated into hallway with PT. VSS pt instructed on hypertensive meds and the need to monitor BP.         Problem: Perioperative Period (Adult)  Goal: Signs and Symptoms of Listed Potential Problems Will be Absent or Manageable (Perioperative Period)  Outcome: Ongoing (interventions implemented as appropriate)    Problem: Knee Replacement, Total (Adult)  Goal: Signs and Symptoms of Listed Potential Problems Will be Absent or Manageable (Knee Replacement, Total)  Outcome: Ongoing (interventions implemented as appropriate)    Problem: Fall Risk (Adult)  Goal: Identify Related Risk Factors and Signs and Symptoms  Outcome: Outcome(s) achieved Date Met:  07/13/17  Goal: Absence of Falls  Outcome: Ongoing (interventions implemented as appropriate)

## 2017-07-13 NOTE — PROGRESS NOTES
Discharge Planning Assessment  [facility]     Patient Name: Kaleigh Rizvi  MRN: [id number]  Today's Date: 7/13/2017    Admit Date: 7/13/2017          Discharge Needs Assessment       07/13/17 1458    Living Environment    Lives With spouse    Living Arrangements house    Home Accessibility stairs to enter home    Number of Stairs to Enter Home 4    Stair Railings at Home inside, present at both sides    Type of Financial/Environmental Concern none    Transportation Available car;family or friend will provide    Living Environment    Quality Of Family Relationships supportive    Able to Return to Prior Living Arrangements yes    Discharge Needs Assessment    Concerns To Be Addressed no discharge needs identified    Readmission Within The Last 30 Days no previous admission in last 30 days    Equipment Currently Used at Home none            Discharge Plan       07/13/17 1500    Case Management/Social Work Plan    Plan home with [facility]    Patient/Family In Agreement With Plan yes    Additional Comments IMM letter signed.  Facesheet verified.  Spoke with patient in room.  Introduced self and explained role. Patient lives with family and plans to return home with  at ID.  Would like [facility].  Referral called to Caryn and she will follow.  CCP will follow.         Discharge Placement     Facility/Agency Request Status Selected? Address Phone Number Fax Number    [facility] Accepted    Yes [address] [address] [phone]                Demographic Summary       07/13/17 1457    Referral Information    Admission Type inpatient    Arrived From admitted as an inpatient    Referral Source admission list    Reason For Consult discharge planning            Functional Status       07/13/17 1458    Functional Status Current    Ambulation 3-->assistive equipment and person    Transferring 3-->assistive equipment and person    Toileting 3-->assistive equipment and  person    Bathing 1-->assistive equipment    Dressing 1-->assistive equipment    Eating 0-->independent    Communication 0-->understands/communicates without difficulty    Swallowing (if score 2 or more for any item, consult Rehab Services) 0-->swallows foods/liquids without difficulty    Change in Functional Status Since Onset of Current Illness/Injury yes    Functional Status Prior    Ambulation 0-->independent    Transferring 0-->independent    Toileting 0-->independent    Bathing 0-->independent    Dressing 0-->independent    Eating 0-->independent    Communication 0-->understands/communicates without difficulty    Swallowing 0-->swallows foods/liquids without difficulty            Psychosocial     None            Abuse/Neglect     None            Legal     None            Substance Abuse     None            Patient Forms     None          Cecilia Harden, RN

## 2017-07-13 NOTE — PLAN OF CARE
Problem: Patient Care Overview (Adult)  Goal: Plan of Care Review  Outcome: Ongoing (interventions implemented as appropriate)    07/13/17 1607   Coping/Psychosocial Response Interventions   Plan Of Care Reviewed With patient   Patient Care Overview   Progress improving   Outcome Evaluation   Outcome Summary/Follow up Plan Pt presents with generalized post op pain, wekaness, and decreased functional mobility and will benefit from skilled PT.         Problem: Inpatient Physical Therapy  Goal: Transfer Training Goal 1 LTG- PT  Outcome: Ongoing (interventions implemented as appropriate)    07/13/17 1607   Transfer Training PT LTG   Transfer Training PT LTG, Date Established 07/13/17   Transfer Training PT LTG, Time to Achieve 3 days   Transfer Training PT LTG, Activity Type all transfers   Transfer Training PT LTG, [place] Level supervision required   Transfer Training PT LTG, Assist Device walker, rolling       Goal: Gait Training Goal LTG- PT  Outcome: Ongoing (interventions implemented as appropriate)    07/13/17 1607   Gait Training PT LTG   Gait Training Goal PT LTG, Date Established 07/13/17   Gait Training Goal PT LTG, Time to Achieve 3 days   Gait Training Goal PT LTG, [place] Level supervision required   Gait Training Goal PT LTG, Assist Device walker, rolling   Gait Training Goal PT LTG, Distance to Achieve household distances       Goal: Stair Training Goal LTG- PT  Outcome: Ongoing (interventions implemented as appropriate)    07/13/17 1607   Stair Training PT LTG   Stair Training Goal PT LTG, Date Established 07/13/17   Stair Training Goal PT LTG, Time to Achieve 3 days   Stair Training Goal PT LTG, Number of Steps 4   Stair Training Goal PT LTG, [place] Level contact guard assist   Stair Training Goal PT LTG, Assist Device 2 handrails       Goal: Range of Motion Goal LTG- PT  Outcome: Ongoing (interventions implemented as appropriate)    07/13/17 1607   Range of Motion PT LTG   Range of  Motion Goal PT LTG, Date Established 07/13/17   Range of Motion Goal PT LTG, Time to Achieve 3 days   Range fo Motion Goal PT LTG, Joint R knee   Range of Motion Goal PT LTG, AROM Fxnal Goal 5-90

## 2017-07-13 NOTE — THERAPY EVALUATION
Acute Care - Physical Therapy Initial Evaluation  [facility]     Patient Name: Kaleigh Rizvi  : 1955  MRN: [id number]  Today's Date: 2017   Onset of Illness/Injury or Date of Surgery Date: 17  Date of Referral to PT: 17  Referring Physician: Tico      Admit Date: 2017     Visit Dx:    ICD-10-CM ICD-9-CM   1. Difficulty walking R26.2 719.7     Patient Active Problem List   Diagnosis   • Osteoarthritis, knee     Past Medical History:   Diagnosis Date   • Acid reflux    • Arthritis    • Gout    • Knee pain    • Sleep apnea    • Urinary frequency      Past Surgical History:   Procedure Laterality Date   • COLONOSCOPY            PT ASSESSMENT (last 72 hours)      PT Evaluation       17 1530 17 1458    Rehab Evaluation    Document Type evaluation  -EJ     Subjective Information agree to therapy  -EJ     Patient Effort, Rehab Treatment good  -EJ     Symptoms Noted During/After Treatment none  -EJ     General Information    Onset of Illness/Injury or Date of Surgery Date 17  -EJ     Referring Physician Tico  -EJ     General Observations supine in bed, no acute distress  -EJ     Prior Level of Function independent:;all household mobility;community mobility;ADL's  -EJ     Equipment Currently Used at Home none  -EJ none  -KH    Plans/Goals Discussed With patient and family;agreed upon  -EJ     Barriers to Rehab none identified  -EJ     Living Environment    Lives With spouse  -EJ spouse  -KH    Living Arrangements house  -EJ house  -KH    Home Accessibility stairs to enter home  -EJ stairs to enter home  -KH    Number of Stairs to Enter Home 4  -EJ 4  -KH    Stair Railings at Home inside, present at both sides  -EJ inside, present at both sides  -KH    Type of Financial/Environmental Concern  none  -KH    Transportation Available  car;family or friend will provide  -KH    Clinical Impression    Date of Referral to PT 17  -EJ     PT Diagnosis s/p R TKR  -EJ      Patient/Family Goals Statement Pt plans home tomorrow  -EJ     Criteria for Skilled Therapeutic Interventions Met treatment indicated  -EJ     Impairments Found (describe specific impairments) gait, locomotion, and balance  -EJ     Rehab Potential good, to achieve stated therapy goals  -EJ     Pain Assessment    Pain Assessment 0-10  -EJ     Pain Score 1  -EJ     Pain Location Knee  -EJ     Pain Orientation Right  -EJ     Cognitive Assessment/Intervention    Current Cognitive/Communication Assessment functional  -EJ     Orientation Status oriented x 4  -EJ     Follows Commands/Answers Questions 100% of the time  -EJ     Personal Safety WNL/WFL  -EJ     Personal Safety Interventions fall prevention program maintained;gait belt;nonskid shoes/slippers when out of bed;supervised activity  -EJ     ROM (Range of Motion)    General ROM no range of motion deficits identified   x R knee  -EJ     MMT (Manual Muscle Testing)    General MMT Assessment no strength deficits identified   x R knee  -EJ     Mobility Assessment/Training    Extremity Weight-Bearing Status right lower extremity  -EJ     Right Lower Extremity Weight-Bearing weight-bearing as tolerated  -EJ     Bed Mobility, Assessment/Treatment    Bed Mob, Supine to Sit, [place] verbal cues required;contact guard assist  -EJ     Bed Mob, Sit to Supine, [place] not tested  -EJ     Transfer Assessment/Treatment    Transfers, Sit-Stand [place] verbal cues required;contact guard assist;1 person + 1 person to manage equipment  -EJ     Transfers, Stand-Sit [place] contact guard assist;1 person + 1 person to manage equipment  -EJ     Transfers, Sit-Stand-Sit, Assist Device standard walker  -EJ     Gait Assessment/Treatment    Gait, [place] Level verbal cues required;contact guard assist;1 person + 1 person to manage equipment  -EJ     Gait, Assistive Device rolling walker  -EJ     Gait, Distance (Feet) 30  -EJ     Gait, Gait Deviations antalgic;gala  decreased;step length decreased  -EJ     Gait, Safety Issues step length decreased  -EJ     Gait, Impairments strength decreased  -EJ     Therapy Exercises    Exercise Protocols total knee  -EJ     Total Knee Exercises right:;10 reps;completed protocol  -EJ     Positioning and Restraints    Pre-Treatment Position in bed  -EJ     Post Treatment Position chair  -EJ     In Chair notified nsg;reclined;call light within reach;encouraged to call for assist;with family/caregiver  -EJ       07/13/17 1443 07/13/17 0810    General Information    Equipment Currently Used at Home none  -MARGARET (P)  none  -DK      07/13/17 0802       General Information    Equipment Currently Used at Home none  -DK     Living Environment    Lives With spouse  -DK     Living Arrangements house  -DK     Home Accessibility stairs to enter home  -DK     Number of Stairs to Enter Home 4  -DK     Stair Railings at Home inside, present at both sides  -DK     Type of Financial/Environmental Concern none  -DK     Transportation Available car;family or friend will provide  -DK       User Key  (r) = Recorded By, (t) = Taken By, (c) = Cosigned By    Initials Name Provider Type    DK Niki Mobley, RN Registered Nurse    MARGARET Lino, RN Registered Nurse    STEPHANIE Brar, PT Physical Therapist    MIK Harden, RN Case Manager          Physical Therapy Education     Title: PT OT SLP Therapies (Done)     Topic: Physical Therapy (Done)     Point: Mobility training (Done)    Learning Progress Summary    Learner Readiness Method Response Comment Documented by Status   Patient Acceptance DES NUNEZ NR   07/13/17 1603 Done               Point: Home exercise program (Done)    Learning Progress Summary    Learner Readiness Method Response Comment Documented by Status   Patient Acceptance DES NUNEZ NR   07/13/17 1603 Done               Point: Body mechanics (Done)    Learning Progress Summary    Learner Readiness Method Response Comment Documented by  Status   Patient Acceptance DES NUNEZ,NR   07/13/17 1603 Done               Point: Precautions (Done)    Learning Progress Summary    Learner Readiness Method Response Comment Documented by Status   Patient Acceptance DES NUNEZ,NR   07/13/17 1603 Done                      User Key     Initials Effective Dates Name Provider Type Discipline     04/21/17 -  Zo Brar, PT Physical Therapist PT                PT Recommendation and Plan  Anticipated Equipment Needs At Discharge: front wheeled walker  Anticipated Discharge Disposition: home with assist, home with home health  Planned Therapy Interventions: bed mobility training, gait training, home exercise program, patient/family education, ROM (Range of Motion), stair training, strengthening, stretching, transfer training  PT Frequency: 2 times/day  Plan of Care Review  Plan Of Care Reviewed With: patient  Progress: improving  Outcome Summary/Follow up Plan: Pt presents with generalized post op pain, wekaness, and decreased functional mobility and will benefit from skilled PT.          IP PT Goals       07/13/17 1607          Transfer Training PT LTG    Transfer Training PT LTG, Date Established 07/13/17  -EJ      Transfer Training PT LTG, Time to Achieve 3 days  -EJ      Transfer Training PT LTG, Activity Type all transfers  -EJ      Transfer Training PT LTG, [place] Level supervision required  -EJ      Transfer Training PT LTG, Assist Device walker, rolling  -EJ      Gait Training PT LTG    Gait Training Goal PT LTG, Date Established 07/13/17  -EJ      Gait Training Goal PT LTG, Time to Achieve 3 days  -EJ      Gait Training Goal PT LTG, [place] Level supervision required  -EJ      Gait Training Goal PT LTG, Assist Device walker, rolling  -EJ      Gait Training Goal PT LTG, Distance to Achieve household distances  -EJ      Stair Training PT LTG    Stair Training Goal PT LTG, Date Established 07/13/17  -EJ      Stair Training Goal PT LTG, Time to  Achieve 3 days  -EJ      Stair Training Goal PT LTG, Number of Steps 4  -EJ      Stair Training Goal PT LTG, [place] Level contact guard assist  -EJ      Stair Training Goal PT LTG, Assist Device 2 handrails  -EJ      Range of Motion PT LTG    Range of Motion Goal PT LTG, Date Established 07/13/17  -EJ      Range of Motion Goal PT LTG, Time to Achieve 3 days  -EJ      Range fo Motion Goal PT LTG, Joint R knee  -EJ      Range of Motion Goal PT LTG, AROM Fxnal Goal 5-90  -EJ        User Key  (r) = Recorded By, (t) = Taken By, (c) = Cosigned By    Initials Name Provider Type    STEPHANIE Brar PT Physical Therapist                Outcome Measures       07/13/17 1600          How much help from another person do you currently need...    Turning from your back to your side while in flat bed without using bedrails? 3  -EJ      Moving from lying on back to sitting on the side of a flat bed without bedrails? 3  -EJ      Moving to and from a bed to a chair (including a wheelchair)? 3  -EJ      Standing up from a chair using your arms (e.g., wheelchair, bedside chair)? 3  -EJ      Climbing 3-5 steps with a railing? 3  -EJ      To walk in hospital room? 3  -EJ      AM-PAC 6 Clicks Score 18  -EJ      Functional Assessment    Outcome Measure Options AM-PAC 6 Clicks Basic Mobility (PT)  -EJ        User Key  (r) = Recorded By, (t) = Taken By, (c) = Cosigned By    Initials Name Provider Type    STEPHANIE Brar PT Physical Therapist           Time Calculation:         PT Charges       07/13/17 1609          Time Calculation    Start Time 1530  -EJ      Stop Time 1600  -EJ      Time Calculation (min) 30 min  -EJ      PT Received On 07/13/17  -EJ      PT - Next Appointment 07/14/17  -EJ      PT Goal Re-Cert Due Date 07/16/17  -EJ        User Key  (r) = Recorded By, (t) = Taken By, (c) = Cosigned By    Initials Name Provider Type    STEPHANIE Brar PT Physical Therapist          Therapy Charges for Today     Code  Description Service Date Service Provider Modifiers Qty    29831405548 HC PT EVAL MOD COMPLEXITY 2 7/13/2017 Zo Brar, PT GP 1    60732169619 HC PT THER PROC EA 15 MIN 7/13/2017 Zo Brar, PT GP 1    66467126172 HC PT THER SUPP EA 15 MIN 7/13/2017 Zo Brar, PT GP 1          PT G-Codes  Outcome Measure Options: AM-PAC 6 Clicks Basic Mobility (PT)      Zo Brar, PT  7/13/2017

## 2017-07-13 NOTE — OP NOTE
OP NOTE   Maikel Doshi M.D. - [facility]    Patient Name:  Kaleigh Rizvi  YOB: 1955  Medical Records Number:  [id number]    Date of Procedure:  7/13/2017    Pre-operative Diagnosis:  DJD Left Knee    Post-operative Diagnosis:  DJD Left Knee    Procedure Performed:  Left Total Knee Replacement     Anesthesia: General, supplemented by a periarticular Exparel/bupivacaine injection.      Surgeon: Maikel Doshi MD     Assistant: Ken Woods MD; note that as part of the surgical procedure, I utilized service of an assistant surgeon, specifically Ken Woods MD. Assistant surgeon participated in crucial portion of the operation. Use of the assistant surgeon greatly reduced overall operative time, thus significantly reducing overall morbidity for the patient.      Implants:      Implant Name Type Inv. Item Serial No.  Lot No. LRB No. Used Action   CMT BONE PALACOS [id number] - [id number] Implant CMT BONE PALACOS [id number]  [facility] [id number] Right 2 Implanted   EXT STEM PERSONA TPR [id number] - [id number] Implant EXT STEM PERSONA TPR [id number]  [employer] [id number] Right 1 Implanted   STEM TIB PERSONA CMT 5D SZG RT - [id number] Implant STEM TIB PERSONA CMT 5D SZG RT  [employer] [facility] [id number] Right 1 Implanted   COMP FEM PERSONA CR CMT COCR STD SZ9 RT - [id number] Implant COMP FEM PERSONA CR CMT COCR STD SZ9 RT  [employer] [facility] [id number] Right 1 Implanted   PAT PERSONA ALLPOLY CMT 35MM - [id number] Implant PAT PERSONA ALLPOLY CMT 35MM  [employer] [facility] [id number] Right 1 Implanted   ART/SRF KN PERSONA/VE MC GH 8TO11 10MM RT - [id number] Implant ART/SRF KN PERSONA/VE MC GH 8TO11 10MM RT   [employer] [facility] [id number] Right 1 Implanted       Tourniquet Time: Was 47 minutes.      Medications: Note that we gave 1 g IV tranexamic acid when the cement was mixed.      Estimated Blood Loss: *No blood loss documented*     Complications: None.      Quality Measures: I  have documented the patient's current medications including dosage, frequency, and route of administration in medical record today. Conservative alternatives were discussed with the patient as part of the decision-making process prior to the procedure. The patient was evaluated immediately preoperatively for cardiovascular and thromboembolic risk factors. He has venous insufficiency. Prophylactic IV antibiotics were administered before the tourniquet went up.      Description of Procedure: After prep and drape, Left knee was approached via midline longitudinal anterior incision. Medial parapatellar arthrotomy was extended to the vastus medialis obliquus which was split in line with the fibers near the medial border, in keeping with minimally invasive technique. Patella was osteotomized proper depth for a 35 patella implant. Pickens holes are created. Patella was subluxed and protected. Intramedullary instrumentation was used on each side of the joint; careful and thorough canal content irrigation. I cut the femur 10 mm depth, 5° valgus controlling rotation. It was sized to a 9 implant. Anterior, posterior, and chamfer cuts are made. Tibia is cut 10 mm depth, 5° of posterior slope. Meniscectomies are completed. Trial reduction is performed. Rotation is marked and keel slot was created.      Bony surface was thoroughly cleaned and dried. I injected the posterior capsule and medial lateral gutter with Exparel solution containing 20 mL Exparel, 25 mL 0.25% bupivacaine with epinephrine, 20 mL saline. Care was taken to protect popliteal neurovascular structures and the peroneal nerve. I cemented the size G tibial baseplate, size 9 femur, and a 35 patella.  Trial reduction revealed a 10 mm MC polyethylene insert best balanced stability and range of motion, and is locked in the tibial tray.      Tourniquet was released; hemostasis obtained. Wound was closed in layers with #1 Vicryl on the capsule, 2-0 Vicryl in subcutaneous  tissue, and staples in the skin over a 1/8-inch drain. Note that I injected my capsule with my Exparel solution during closure.         Maikel Doshi MD  7/13/2017  11:34 AM

## 2017-07-13 NOTE — ANESTHESIA POSTPROCEDURE EVALUATION
Patient: Kaleigh Rizvi    Procedure Summary     Date Anesthesia Start Anesthesia Stop Room / Location    07/13/17 0954 1138  [facility] /  [facility]       Procedure Diagnosis Surgeon Provider    RT TOTAL KNEE ARTHROPLASTY (Right Knee) No diagnosis on file. MD Faustino Santa MD          Anesthesia Type: general  Last vitals  /80 (07/13/17 1300)    Temp      Pulse 66 (07/13/17 1300)   Resp 16 (07/13/17 1300)    SpO2 96 % (07/13/17 1300)      Post Anesthesia Care and Evaluation    Patient location during evaluation: PACU  Patient participation: complete - patient participated  Level of consciousness: awake and alert  Pain management: adequate  Airway patency: patent  Anesthetic complications: No anesthetic complications    Cardiovascular status: acceptable  Respiratory status: acceptable  Hydration status: acceptable

## 2017-07-13 NOTE — ANESTHESIA PREPROCEDURE EVALUATION
Anesthesia Evaluation     Patient summary reviewed and Nursing notes reviewed   no history of anesthetic complications:  NPO Solid Status: > 8 hours  NPO Liquid Status: > 8 hours     Airway   Mallampati: II  TM distance: >3 FB  Neck ROM: full  no difficulty expected  Dental - normal exam     Pulmonary - normal exam   (+) sleep apnea on CPAP,   Cardiovascular - normal exam    (+) hypertension,       Neuro/Psych  GI/Hepatic/Renal/Endo    (+) obesity,  GERD,     Musculoskeletal     Abdominal  - normal exam   Substance History      OB/GYN          Other   (+) arthritis                                     Anesthesia Plan    ASA 3     general     intravenous induction   Anesthetic plan and risks discussed with patient.

## 2017-07-13 NOTE — PLAN OF CARE
Problem: Patient Care Overview (Adult)  Goal: Plan of Care Review  Outcome: Ongoing (interventions implemented as appropriate)    07/13/17 0810   Coping/Psychosocial Response Interventions   Plan Of Care Reviewed With patient;spouse   Patient Care Overview   Progress no change       Goal: Adult Individualization and Mutuality  Outcome: Outcome(s) achieved Date Met:  07/13/17 07/13/17 0810   Individualization   Patient Specific Preferences call Raymond   Patient Specific Goals walk without pain   Patient Specific Interventions none   Mutuality/Individual Preferences   What Anxieties, Fears or Concerns Do You Have About Your Health or Care? none   What Questions Do You Have About Your Health or Care? none   What Information Would Help Us Give You More Personalized Care? none       Goal: Discharge Needs Assessment  Outcome: Ongoing (interventions implemented as appropriate)    07/13/17 0810   Discharge Needs Assessment   Concerns To Be Addressed denies needs/concerns at this time   Readmission Within The Last 30 Days no previous admission in last 30 days   Current Health   Anticipated Changes Related to Illness none   Self-Care   Equipment Currently Used at Home none         Problem: Perioperative Period (Adult)  Goal: Signs and Symptoms of Listed Potential Problems Will be Absent or Manageable (Perioperative Period)  Outcome: Ongoing (interventions implemented as appropriate)    07/13/17 0810   Perioperative Period   Problems Assessed (Perioperative Period) pain;hypoxia/hypoxemia;situational response   Problems Present (Perioperative Period) none

## 2017-07-14 VITALS
TEMPERATURE: 98.2 F | WEIGHT: 245.8 LBS | SYSTOLIC BLOOD PRESSURE: 135 MMHG | BODY MASS INDEX: 34.41 KG/M2 | HEART RATE: 79 BPM | OXYGEN SATURATION: 94 % | RESPIRATION RATE: 16 BRPM | DIASTOLIC BLOOD PRESSURE: 80 MMHG | HEIGHT: 71 IN

## 2017-07-14 LAB
ANION GAP SERPL CALCULATED.3IONS-SCNC: 12.8 MMOL/L
BUN BLD-MCNC: 16 MG/DL (ref 6–20)
BUN/CREAT SERPL: 15.7 (ref 7–25)
CALCIUM SPEC-SCNC: 9.2 MG/DL (ref 8.6–10.5)
CHLORIDE SERPL-SCNC: 100 MMOL/L (ref 98–107)
CO2 SERPL-SCNC: 24.2 MMOL/L (ref 22–29)
CREAT BLD-MCNC: 1.02 MG/DL (ref 0.76–1.27)
GFR SERPL CREATININE-BSD FRML MDRD: 75 ML/MIN/1.73
GLUCOSE BLD-MCNC: 162 MG/DL (ref 65–99)
HCT VFR BLD AUTO: 37.5 % (ref 40.4–52.2)
HGB BLD-MCNC: 12.3 G/DL (ref 13.7–17.6)
POTASSIUM BLD-SCNC: 5.1 MMOL/L (ref 3.5–5.2)
SODIUM BLD-SCNC: 137 MMOL/L (ref 136–145)

## 2017-07-14 PROCEDURE — 80048 BASIC METABOLIC PNL TOTAL CA: CPT | Performed by: ORTHOPAEDIC SURGERY

## 2017-07-14 PROCEDURE — 85018 HEMOGLOBIN: CPT | Performed by: ORTHOPAEDIC SURGERY

## 2017-07-14 PROCEDURE — 97110 THERAPEUTIC EXERCISES: CPT

## 2017-07-14 PROCEDURE — 25010000002 FONDAPARINUX PER 0.5 MG: Performed by: ORTHOPAEDIC SURGERY

## 2017-07-14 PROCEDURE — 97150 GROUP THERAPEUTIC PROCEDURES: CPT

## 2017-07-14 PROCEDURE — 25010000002 KETOROLAC TROMETHAMINE PER 15 MG: Performed by: ORTHOPAEDIC SURGERY

## 2017-07-14 PROCEDURE — 85014 HEMATOCRIT: CPT | Performed by: ORTHOPAEDIC SURGERY

## 2017-07-14 PROCEDURE — 25010000003 CEFAZOLIN IN DEXTROSE 2-4 GM/100ML-% SOLUTION: Performed by: ORTHOPAEDIC SURGERY

## 2017-07-14 RX ORDER — FONDAPARINUX SODIUM 2.5 MG/.5ML
2.5 INJECTION SUBCUTANEOUS ONCE
Status: COMPLETED | OUTPATIENT
Start: 2017-07-14 | End: 2017-07-14

## 2017-07-14 RX ORDER — OXYCODONE HYDROCHLORIDE AND ACETAMINOPHEN 5; 325 MG/1; MG/1
1-2 TABLET ORAL EVERY 4 HOURS PRN
Qty: 100 TABLET | Refills: 0 | Status: SHIPPED | OUTPATIENT
Start: 2017-07-14 | End: 2017-07-23

## 2017-07-14 RX ADMIN — KETOROLAC TROMETHAMINE 30 MG: 30 INJECTION, SOLUTION INTRAMUSCULAR at 03:23

## 2017-07-14 RX ADMIN — CETIRIZINE HYDROCHLORIDE 10 MG: 10 TABLET, FILM COATED ORAL at 09:00

## 2017-07-14 RX ADMIN — OXYCODONE HYDROCHLORIDE AND ACETAMINOPHEN 2 TABLET: 5; 325 TABLET ORAL at 01:01

## 2017-07-14 RX ADMIN — FONDAPARINUX SODIUM 2.5 MG: 2.5 INJECTION, SOLUTION SUBCUTANEOUS at 09:00

## 2017-07-14 RX ADMIN — LISINOPRIL AND HYDROCHLOROTHIAZIDE 1 TABLET: 20; 12.5 TABLET ORAL at 08:59

## 2017-07-14 RX ADMIN — CEFAZOLIN SODIUM 2 G: 2 INJECTION, SOLUTION INTRAVENOUS at 03:23

## 2017-07-14 RX ADMIN — ASPIRIN 325 MG: 325 TABLET, DELAYED RELEASE ORAL at 09:00

## 2017-07-14 RX ADMIN — OXYCODONE HYDROCHLORIDE AND ACETAMINOPHEN 2 TABLET: 5; 325 TABLET ORAL at 13:59

## 2017-07-14 RX ADMIN — PANTOPRAZOLE SODIUM 40 MG: 40 TABLET, DELAYED RELEASE ORAL at 05:17

## 2017-07-14 RX ADMIN — ALLOPURINOL 300 MG: 300 TABLET ORAL at 09:00

## 2017-07-14 RX ADMIN — AMLODIPINE BESYLATE 5 MG: 5 TABLET ORAL at 08:59

## 2017-07-14 RX ADMIN — OXYCODONE HYDROCHLORIDE AND ACETAMINOPHEN 2 TABLET: 5; 325 TABLET ORAL at 09:30

## 2017-07-14 RX ADMIN — OXYCODONE HYDROCHLORIDE AND ACETAMINOPHEN 2 TABLET: 5; 325 TABLET ORAL at 05:17

## 2017-07-14 RX ADMIN — DOCUSATE SODIUM 100 MG: 100 CAPSULE, LIQUID FILLED ORAL at 13:59

## 2017-07-14 RX ADMIN — KETOROLAC TROMETHAMINE 30 MG: 30 INJECTION, SOLUTION INTRAMUSCULAR at 09:00

## 2017-07-14 RX ADMIN — FERROUS SULFATE TAB 325 MG (65 MG ELEMENTAL FE) 325 MG: 325 (65 FE) TAB at 08:59

## 2017-07-14 NOTE — THERAPY TREATMENT NOTE
Acute Care - Physical Therapy Treatment Note  [facility]     Patient Name: Kaleigh Rizvi  : 1955  MRN: [id number]  Today's Date: 2017  Onset of Illness/Injury or Date of Surgery Date: 17  Date of Referral to PT: 17  Referring Physician: Tico    Admit Date: 2017    Visit Dx:    ICD-10-CM ICD-9-CM   1. Difficulty walking R26.2 719.7     Patient Active Problem List   Diagnosis   • Osteoarthritis, knee               Adult Rehabilitation Note       17 1249          Rehab Assessment/Intervention    Discipline physical therapy assistant  -      Document Type therapy note (daily note)  -      Subjective Information agree to therapy;complains of;swelling  -      Precautions/Limitations fall precautions  -      Recorded by [] Roxanne Elizabeth PTA      Pain Assessment    Pain Assessment 0-10  -      Pain Score 3  -      Pain Type Surgical pain  -      Pain Location Knee  -      Pain Orientation Right  -      Pain Intervention(s) Medication (See MAR);Repositioned  -      Response to Interventions laury  -      Recorded by [] Roxanne Elizabeth PTA      ROM (Range of Motion)    General ROM Detail -3-100  -      Recorded by [] Roxanne Elizabeth PTA      Bed Mobility, Assessment/Treatment    Bed Mob, Supine to Sit, Rankin conditional independence  -      Recorded by [] Roxanne Elizabeth PTA      Transfer Assessment/Treatment    Transfers, Sit-Stand Rankin supervision required;verbal cues required  -      Transfers, Stand-Sit Rankin supervision required;verbal cues required  -      Transfers, Sit-Stand-Sit, Assist Device standard walker  -      Recorded by [] Roxanne Elizabeth PTA      Gait Assessment/Treatment    Gait, Rankin Level supervision required;verbal cues required  -      Gait, Assistive Device standard walker  -      Gait, Distance (Feet) 150  -      Gait, Gait Deviations gala decreased  -      Gait, Comment cues  for std wx placement , slow paced--ordered rwx for home  -JM      Recorded by [JOESPH] Roxanne Elizabeth PTA      Stairs Assessment/Treatment    Number of Stairs 4  -JM      Stairs, Handrail Location both sides  -      Stairs, Butler Level contact guard assist;verbal cues required  -      Stairs, Technique Used step to step (ascending);step to step (descending)  -JM      Recorded by [RODRIGO Elizabeth PTA      Therapy Exercises    Exercise Protocols total knee  -      Total Knee Exercises right:;15 reps;completed protocol  -JM      Recorded by [JOESPH] Roxanne Elizabeth PTA      Positioning and Restraints    Pre-Treatment Position sitting in chair/recliner  -JM      In Chair reclined;call light within reach;encouraged to call for assist  -JM      Recorded by [JOESPH] Roxanne Elizabeth PTA        User Key  (r) = Recorded By, (t) = Taken By, (c) = Cosigned By    Initials Name Effective Dates    JOESPH Elizabeth PTA 02/18/16 -                 IP PT Goals       07/13/17 1607          Transfer Training PT LTG    Transfer Training PT LTG, Date Established 07/13/17  -EJ      Transfer Training PT LTG, Time to Achieve 3 days  -EJ      Transfer Training PT LTG, Activity Type all transfers  -EJ      Transfer Training PT LTG, Butler Level supervision required  -EJ      Transfer Training PT LTG, Assist Device walker, rolling  -EJ      Gait Training PT LTG    Gait Training Goal PT LTG, Date Established 07/13/17  -EJ      Gait Training Goal PT LTG, Time to Achieve 3 days  -EJ      Gait Training Goal PT LTG, Butler Level supervision required  -EJ      Gait Training Goal PT LTG, Assist Device walker, rolling  -EJ      Gait Training Goal PT LTG, Distance to Achieve household distances  -EJ      Stair Training PT LTG    Stair Training Goal PT LTG, Date Established 07/13/17  -EJ      Stair Training Goal PT LTG, Time to Achieve 3 days  -EJ      Stair Training Goal PT LTG, Number of Steps 4  -EJ      Stair Training Goal PT  LTG, [place]as[place] Level contact guard assist  -EJ      Stair Training Goal PT LTG, Assist Device 2 handrails  -EJ      Range of Motion PT LTG    Range of Motion Goal PT LTG, Date Established 07/13/17  -EJ      Range of Motion Goal PT LTG, Time to Achieve 3 days  -EJ      Range fo Motion Goal PT LTG, Joint R knee  -EJ      Range of Motion Goal PT LTG, AROM Fxnal Goal 5-90  -EJ        User Key  (r) = Recorded By, (t) = Taken By, (c) = Cosigned By    Initials Name Provider Type     Zo Brar, PT Physical Therapist          Physical Therapy Education     Title: PT OT SLP Therapies (Resolved)     Topic: Physical Therapy (Resolved)     Point: Mobility training (Resolved)    Learning Progress Summary    Learner Readiness Method Response Comment Documented by Status   Patient Eager E,TB,D,H VURICARDO   07/14/17 1252 Done    Acceptance E,D VU,NR   07/13/17 1603 Done               Point: Home exercise program (Resolved)    Learning Progress Summary    Learner Readiness Method Response Comment Documented by Status   Patient Eager E,TB,D,H VU,RICARDO   07/14/17 1252 Done    Acceptance E,D VU,NR   07/13/17 1603 Done               Point: Body mechanics (Resolved)    Learning Progress Summary    Learner Readiness Method Response Comment Documented by Status   Patient Eager E,TB,D,H VU,RICARDO   07/14/17 1252 Done    Acceptance E,D VU,NR   07/13/17 1603 Done               Point: Precautions (Resolved)    Learning Progress Summary    Learner Readiness Method Response Comment Documented by Status   Patient Eager E,TB,D,H VU,RICARDO   07/14/17 1252 Done    Acceptance E,D VU,NR   07/13/17 1603 Done                      User Key     Initials Effective Dates Name Provider Type [facility] 02/18/16 -  Roxanne Elizabeth, PTA Physical Therapy Assistant PT     04/21/17 -  Zo Brar, PT Physical Therapist PT                    PT Recommendation and Plan  Anticipated Equipment Needs At Discharge: front wheeled  walker  Anticipated Discharge Disposition: home with assist, home with home health  Planned Therapy Interventions: bed mobility training, gait training, home exercise program, patient/family education, ROM (Range of Motion), stair training, strengthening, stretching, transfer training  PT Frequency: 2 times/day             Outcome Measures       07/14/17 1200 07/13/17 1600       How much help from another person do you currently need...    Turning from your back to your side while in flat bed without using bedrails? 4  -JM 3  -EJ     Moving from lying on back to sitting on the side of a flat bed without bedrails? 4  -JM 3  -EJ     Moving to and from a bed to a chair (including a wheelchair)? 4  -JM 3  -EJ     Standing up from a chair using your arms (e.g., wheelchair, bedside chair)? 4  -JM 3  -EJ     Climbing 3-5 steps with a railing? 3  -JM 3  -EJ     To walk in hospital room? 4  -JM 3  -EJ     AM-PAC 6 Clicks Score 23  -JM 18  -EJ     Functional Assessment    Outcome Measure Options  AM-PAC 6 Clicks Basic Mobility (PT)  -       User Key  (r) = Recorded By, (t) = Taken By, (c) = Cosigned By    Initials Name Provider Type    JOESPH Elizabeth PTA Physical Therapy Assistant     Zo Brar, ANI Physical Therapist           Time Calculation:         PT Charges       07/14/17 1254          Time Calculation    Start Time 1030  -      Stop Time 1140  -      Time Calculation (min) 70 min  -JM      PT Received On 07/14/17  -        User Key  (r) = Recorded By, (t) = Taken By, (c) = Cosigned By    Initials Name Provider Type    JOESPH Elizabeth PTA Physical Therapy Assistant          Therapy Charges for Today     Code Description Service Date Service Provider Modifiers Qty    95177476173 HC PT THER PROC EA 15 MIN 7/14/2017 Roxanne Elizabeth PTA GP 2    85123305281 HC PT THER PROC GROUP 7/14/2017 Roxanne Elizabeth PTA GP 1          PT G-Codes  Outcome Measure Options: AM-PAC 6 Clicks Basic Mobility  (PT)    Roxanne Elizabeth, PTA  7/14/2017

## 2017-07-14 NOTE — DISCHARGE SUMMARY
Discharge Summary   Maikel Doshi M.D.    NAME: Kaleigh Rizvi ADMIT: 2017   : 1955  PCP: Candice Soriano MD    MRN: [id number] LOS: 1 days   AGE/SEX: 61 y.o. male  ROOM:        Date of Discharge:  17    Primary Discharge Diagnosis:  Osteoarthritis, knee [M17.9]    Secondary Discharge Diagnosis:    Problem List Items Addressed This Visit     None          Procedures Performed:  Right Total Knee Arthroplasty    Hospital Course:    Kaleigh Rizvi is a 61 y.o.  male who underwent successful Right Total Knee Arthroplasty on 2017.  Kaleigh Rizvi was started on Aspirin 325mg po daily immediately post-operatively for DVT prophylaxis.  On post-op day 1 the patients dressing was changed, drain removed and their incision was clean, with no signs of infection and their calf was soft, with no signs of DVT.  The patient progressed well with physical therapy and the patients hemoglobin remained stable. On post-operative day 1 the patient was felt ready for discharge.      Total Knee Joint Replacement Discharge Instructions:    I. ACTIVITIES:    1. Exercises:  ? Complete exercise program as taught by the hospital physical therapist 2 times per day  ? Exercise program will be advanced by the physical therapist  ? During the day be up ambulating every 2 hours (while awake) for short distances  ? Complete the ankle pump exercises at least 10 times per hour (while awake)  ? Elevate legs most of the day the first week post operatively and thereafter elevate legs when in bed and for at least 30 minutes during the day. Caution must be taken to avoid pillow placement under the bend of the knee as this can led to flexion contractures of the knee.  ? Use cold packs 20-30 minutes approximately 5 times per day. This should be done before and after completing your exercises and at any time you are experiencing pain/ stiffness in your operative extremity.    2. Activities of Daily Living:  ? No tub  baths, hot tubs, or swimming pools for 4 weeks  ? May shower and let water run over the incision on post-operative day #7 if no drainage. Do not scrub or rub the incision. Simply let the water run over the incision and pat dry.    II. Precautions:    ? Everyone that comes near you should wash their hands  ? No elective dental, genital-urinary, or colon procedures or surgical procedures for 12 weeks after surgery unless absolutely necessary.  ?  If dental work or surgical procedure is deemed absolutely necessary during the first 12 weeks, you will need to contact your surgeon as you will need to take antibiotics 1 hour prior to any dental work (including teeth cleanings).  ? Please discuss with your surgeon prophylactic antibiotics as the length of time this intervention will be necessary for you varies with each patient’s health history and situation.  ? Avoid sick people. If you must be around someone who is ill, they should wear a mask.  ? Avoid visits to the Emergency Room or Urgent Care unless you are having a life threatening event.     III. INCISION CARE:    ? Wash your hands prior to dressing changes  ? Change the dressing as needed to keep incision clean and dry. Utilize dry gauze and paper tape. Avoid touching the side of the gauze that goes against the incision with your hands.  ? No creams or ointments to the incision  ? May remove dressing once the incision is free of drainage  ? Do not touch or pick at the incision  ? Check incision every day and notify surgeon immediately if any of the following signs or symptoms are noted:  o Increase in redness  o Increase in swelling around the incision and of the entire extremity  o Increase in pain  o Drainage oozing from the incision  o Pulling apart of the edges of the incision  o Increase in overall body temperature (greater than 100.5 degrees)  ? Your surgeon will instruct you regarding suture or staple removal    IV. Medications:     1. Anticoagulants: You  will be discharged on an anticoagulant. This is a prophylactic medication that helps prevent blood clots during your post-operative period. The type and length of dosage varies based on your individual needs, procedure performed, and surgeon’s preference.    ? While taking the anticoagulant, you should avoid taking any additional aspirin, ibuprofen (Advil or Motrin), Aleve (Naprosyn) or other non-steroidal anti-inflammatory medications.   ? Notify surgeon immediately if any arina bleeding is noted in the urine, stool, emesis, or from the nose or the incision. Blood in the stool will often appear as black rather than red. Blood in urine may appear as pink. Blood in emesis may appear as brown/black like coffee grounds.  ? You will need to apply pressure for longer periods of time to any cuts or abrasions to stop bleeding  ? Avoid alcohol while taking anticoagulants    2. Stool Softeners: You will be at greater risk of constipation after surgery due to being less mobile and the pain medications.   ? Take stool softeners as instructed by your surgeon while on pain medications. Bran cereal is most effective. Over the counter Colace 100 mg 1-2 capsules twice daily.   ? Drink plenty of fluids, and eat fruits and vegetables during your recovery time    3. Pain Medications utilized after surgery are narcotics and the law requires that the following information be given to all patients that are prescribed narcotics:  ? CLASSIFICATION: Pain medications are called Opioids and are narcotics  ? LEGALITIES: It is illegal to share narcotics with others and to drive within 24 hours of taking narcotics  ? POTENTIAL SIDE EFFECTS: Potential side effects of opioids include: nausea, vomiting, itching, dizziness, drowsiness, dry mouth, constipation, and difficulty urinating.  ? POTENTIAL ADVERSE EFFECTS:   o Opioid tolerance can develop with use of pain medications and this simply means that it requires more and more of the medication to  control pain; however, this is seen more in patients that use opioids for longer periods of time.  o Opioid dependence can develop with use of Opioids and this simply means that to stop the medication can cause withdrawal symptoms; however, this is seen with patients that use Opioids for longer periods of time.  o Opioid addiction can develop with use of Opioids and the incidence of this is very unlikely in patients who take the medications as ordered and stop the medications as instructed.  o Opioid overdose can be dangerous, but is unlikely when the medication is taken as ordered and stopped when ordered. It is important not to mix opioids with alcohol or with and type of sedative such as Benadryl as this can lead to over sedation and respiratory difficulty.  ? DOSAGE:   o Pain medications will need to be taken consistently for the first week to decrease pain and promote adequate pain relief and participation in physical therapy.  o After the initial surgical pain begins to resolve, you may begin to decrease the pain medication. By the end of 6-8 weeks, you should be off of pain medications.  o Refills will not be given by the office during evening hours, on weekends, or after 6-8 weeks post-op.  o To seek refills on pain medications during the initial 6 week post-operative period, you must call the office 48 hours in advance to request the refill. The office will then notify you when to  the prescription. DO NOT wait until you are out of the medication to request a refill.    V. FOLLOW-UP VISITS:  ? You will need to follow up in the office with your surgeon in 3 weeks. Please call this number [phone] to schedule this appointment.  If you have any concerns or suspected complications prior to your follow up visit, please call your surgeons office. Do not wait until your appointment time if you suspect complications. These will need to be addressed in the office promptly.      Discharge Medications:      1) Percocet 5/325  1-2 po q 4-6 hours for pain control  2)  Aspirin 325 mg po daily for 6 weeks.      Maikel Doshi MD  7/14/2017  7:31 AM

## 2017-07-14 NOTE — PLAN OF CARE
Problem: Patient Care Overview (Adult)  Goal: Plan of Care Review  Outcome: Ongoing (interventions implemented as appropriate)    07/14/17 0403   Coping/Psychosocial Response Interventions   Plan Of Care Reviewed With patient   Patient Care Overview   Progress improving   Outcome Evaluation   Outcome Summary/Follow up Plan pt voices adequate pain control, VSS, dressing CDI, ambulating in room, voiding without difficulty, educated on the importance of BP at home related to HTN, possible DC home later today       Goal: Adult Individualization and Mutuality  Outcome: Ongoing (interventions implemented as appropriate)    07/14/17 0403   Individualization   Patient Specific Preferences none stated   Patient Specific Goals pain control and improved ambulation   Patient Specific Interventions offer PRN pain meds in a timely manner       Goal: Discharge Needs Assessment  Outcome: Ongoing (interventions implemented as appropriate)    07/14/17 0403   Discharge Needs Assessment   Concerns To Be Addressed basic needs concerns   Readmission Within The Last 30 Days no previous admission in last 30 days   Equipment Needed After Discharge walker, standard;wound care supplies   Discharge Facility/Level Of Care Needs home with home health   Discharge Disposition still a patient   Current Health   Anticipated Changes Related to Illness inability to care for self   Self-Care   Equipment Currently Used at Home none   Living Environment   Transportation Available car;family or friend will provide         Problem: Perioperative Period (Adult)  Goal: Signs and Symptoms of Listed Potential Problems Will be Absent or Manageable (Perioperative Period)  Outcome: Ongoing (interventions implemented as appropriate)    07/14/17 0403   Perioperative Period   Problems Assessed (Perioperative Period) all   Problems Present (Perioperative Period) pain;situational response         Problem: Knee Replacement, Total (Adult)  Goal: Signs and Symptoms of  Listed Potential Problems Will be Absent or Manageable (Knee Replacement, Total)  Outcome: Ongoing (interventions implemented as appropriate)    07/14/17 0403   Knee Replacement, Total   Problems Assessed (Total Knee Replacement) all   Problems Present (Total Knee Replacement) pain;decreased range of motion;functional decline/self care deficit;situational response         Problem: Fall Risk (Adult)  Goal: Absence of Falls  Outcome: Ongoing (interventions implemented as appropriate)    07/14/17 0403   Fall Risk (Adult)   Absence of Falls achieves outcome

## 2017-07-14 NOTE — PROGRESS NOTES
"/85 (BP Location: Right arm, Patient Position: Lying)  Pulse 74  Temp 97 °F (36.1 °C) (Oral)   Resp 16  Ht 71\" (180.3 cm)  Wt 245 lb 12.8 oz (111 kg)  SpO2 96%  BMI 34.28 kg/m2    Lab Results (last 24 hours)     Procedure Component Value Units Date/Time    Hemoglobin & Hematocrit, Blood [[id number]]  (Abnormal) Collected:  07/14/17 0352    Specimen:  Blood Updated:  07/14/17 0436     Hemoglobin 12.3 (L) g/dL      Hematocrit 37.5 (L) %     Basic Metabolic Panel [[id number]]  (Abnormal) Collected:  07/14/17 0352    Specimen:  Blood Updated:  07/14/17 0508     Glucose 162 (H) mg/dL      BUN 16 mg/dL      Creatinine 1.02 mg/dL      Sodium 137 mmol/L      Potassium 5.1 mmol/L      Chloride 100 mmol/L      CO2 24.2 mmol/L      Calcium 9.2 mg/dL      eGFR Non African Amer 75 mL/min/1.73      BUN/Creatinine Ratio 15.7     Anion Gap 12.8 mmol/L     Narrative:       GFR Normal >60  Chronic Kidney Disease <60  Kidney Failure <15          Imaging Results (last 24 hours)     Procedure Component Value Units Date/Time    XR Knee 1 or 2 View Right [[id number]] Collected:  07/13/17 1217     Updated:  07/13/17 1221    Narrative:       2 VIEWS OF THE RIGHT KNEE     HISTORY: Postoperative knee pain     FINDINGS:  1. Satisfactory appearance following total knee arthroplasty, no  evidence of a complication.     This report was finalized on 7/13/2017 12:17 PM by Dr. Tima Gautam MD.             Patient Care Team:  Candice Soriano MD as PCP - General (Internal Medicine)    SUBJECTIVE  comfortable  PHYSICAL EXAM   NV intact  Active Problems:    Osteoarthritis, knee      PLAN / DISPOSITION:  Arixtra one time dose  HH discharge today  Maikel Doshi MD  07/14/17  7:25 AM      "

## 2022-11-22 ENCOUNTER — PRE-ADMISSION TESTING (OUTPATIENT)
Dept: PREADMISSION TESTING | Facility: HOSPITAL | Age: 67
End: 2022-11-22

## 2022-11-22 ENCOUNTER — HOSPITAL ENCOUNTER (OUTPATIENT)
Dept: GENERAL RADIOLOGY | Facility: HOSPITAL | Age: 67
Discharge: HOME | End: 2022-11-22

## 2022-11-22 VITALS
BODY MASS INDEX: 35.56 KG/M2 | OXYGEN SATURATION: 98 % | HEIGHT: 71 IN | DIASTOLIC BLOOD PRESSURE: 84 MMHG | RESPIRATION RATE: 16 BRPM | WEIGHT: 254 LBS | SYSTOLIC BLOOD PRESSURE: 148 MMHG | HEART RATE: 90 BPM | TEMPERATURE: 98.8 F

## 2022-11-22 LAB
ALBUMIN SERPL-MCNC: 3.8 G/DL (ref 3.5–5.2)
ALBUMIN/GLOB SERPL: 1.4 G/DL
ALP SERPL-CCNC: 59 U/L (ref 39–117)
ALT SERPL W P-5'-P-CCNC: 26 U/L (ref 1–41)
ANION GAP SERPL CALCULATED.3IONS-SCNC: 11.8 MMOL/L (ref 5–15)
AST SERPL-CCNC: 21 U/L (ref 1–40)
BILIRUB SERPL-MCNC: 0.3 MG/DL (ref 0–1.2)
BUN SERPL-MCNC: 18 MG/DL (ref 8–23)
BUN/CREAT SERPL: 18.8 (ref 7–25)
CALCIUM SPEC-SCNC: 9.5 MG/DL (ref 8.6–10.5)
CHLORIDE SERPL-SCNC: 103 MMOL/L (ref 98–107)
CO2 SERPL-SCNC: 24.2 MMOL/L (ref 22–29)
CREAT SERPL-MCNC: 0.96 MG/DL (ref 0.76–1.27)
DEPRECATED RDW RBC AUTO: 42.9 FL (ref 37–54)
EGFRCR SERPLBLD CKD-EPI 2021: 87.7 ML/MIN/1.73
ERYTHROCYTE [DISTWIDTH] IN BLOOD BY AUTOMATED COUNT: 12.7 % (ref 12.3–15.4)
GLOBULIN UR ELPH-MCNC: 2.8 GM/DL
GLUCOSE SERPL-MCNC: 127 MG/DL (ref 65–99)
HBA1C MFR BLD: 5.4 % (ref 4.8–5.6)
HCT VFR BLD AUTO: 37.6 % (ref 37.5–51)
HGB BLD-MCNC: 12.8 G/DL (ref 13–17.7)
INR PPP: 0.98 (ref 0.9–1.1)
MCH RBC QN AUTO: 31.7 PG (ref 26.6–33)
MCHC RBC AUTO-ENTMCNC: 34 G/DL (ref 31.5–35.7)
MCV RBC AUTO: 93.1 FL (ref 79–97)
PLATELET # BLD AUTO: 257 10*3/MM3 (ref 140–450)
PMV BLD AUTO: 9.1 FL (ref 6–12)
POTASSIUM SERPL-SCNC: 3.4 MMOL/L (ref 3.5–5.2)
PROT SERPL-MCNC: 6.6 G/DL (ref 6–8.5)
PROTHROMBIN TIME: 13 SECONDS (ref 11.7–14.2)
QT INTERVAL: 360 MS
RBC # BLD AUTO: 4.04 10*6/MM3 (ref 4.14–5.8)
SODIUM SERPL-SCNC: 139 MMOL/L (ref 136–145)
WBC NRBC COR # BLD: 5.87 10*3/MM3 (ref 3.4–10.8)

## 2022-11-22 PROCEDURE — 93010 ELECTROCARDIOGRAM REPORT: CPT | Performed by: INTERNAL MEDICINE

## 2022-11-22 PROCEDURE — 80053 COMPREHEN METABOLIC PANEL: CPT

## 2022-11-22 PROCEDURE — 71046 X-RAY EXAM CHEST 2 VIEWS: CPT

## 2022-11-22 PROCEDURE — 36415 COLL VENOUS BLD VENIPUNCTURE: CPT

## 2022-11-22 PROCEDURE — 83036 HEMOGLOBIN GLYCOSYLATED A1C: CPT

## 2022-11-22 PROCEDURE — 85610 PROTHROMBIN TIME: CPT

## 2022-11-22 PROCEDURE — 85027 COMPLETE CBC AUTOMATED: CPT

## 2022-11-22 PROCEDURE — 73560 X-RAY EXAM OF KNEE 1 OR 2: CPT

## 2022-11-22 PROCEDURE — 93005 ELECTROCARDIOGRAM TRACING: CPT

## 2022-11-22 RX ORDER — ACETAMINOPHEN 500 MG
500 TABLET ORAL EVERY 6 HOURS PRN
COMMUNITY

## 2022-11-22 RX ORDER — CHLORHEXIDINE GLUCONATE 500 MG/1
CLOTH TOPICAL
COMMUNITY
End: 2022-12-07 | Stop reason: HOSPADM

## 2022-11-22 RX ORDER — ASPIRIN 81 MG/1
81 TABLET ORAL DAILY
COMMUNITY
End: 2022-12-07 | Stop reason: HOSPADM

## 2022-11-22 ASSESSMENT — KOOS JR
KOOS JR SCORE: 73.342
KOOS JR SCORE: 5

## 2022-11-22 NOTE — DISCHARGE INSTRUCTIONS
Take the following medications the morning of surgery: allopurinol, amlodipine, omeprazole    Arrival time :515am      General Instructions:  Do not eat solid food after midnight the night before surgery.  You may drink clear liquids day of surgery but must stop at least one hour before your hospital arrival time.  It is beneficial for you to have a clear drink that contains carbohydrates the day of surgery.  We suggest a 12 to 20 ounce bottle of Gatorade or Powerade for non-diabetic patients or a 12 to 20 ounce bottle of G2 or Powerade Zero for diabetic patients. (Pediatric patients, are not advised to drink a 12 to 20 ounce carbohydrate drink)    Clear liquids are liquids you can see through.  Nothing red in color.     Plain water                               Sports drinks  Sodas                                   Gelatin (Jell-O)  Fruit juices without pulp such as white grape juice and apple juice  Popsicles that contain no fruit or yogurt  Tea or coffee (no cream or milk added)  Gatorade / Powerade  G2 / Powerade Zero    Patients who avoid smoking, chewing tobacco and alcohol for 4 weeks prior to surgery have a reduced risk of post-operative complications.  Quit smoking as many days before surgery as you can.  Do not smoke, use chewing tobacco or drink alcohol the day of surgery.   If applicable bring your C-PAP/ BI-PAP machine.  Bring any papers given to you in the doctor’s office.  Wear clean comfortable clothes.  Do not wear contact lenses, false eyelashes or make-up.  Bring a case for your glasses.   Bring crutches or walker if applicable.  Remove all piercings.  Leave jewelry and any other valuables at home.  Hair extensions with metal clips must be removed prior to surgery.  The Pre-Admission Testing nurse will instruct you to bring medications if unable to obtain an accurate list in Pre-Admission Testing.      Preventing a Surgical Site Infection:  For 2 to 3 days before surgery, avoid shaving with a  razor because the razor can irritate skin and make it easier to develop an infection.    Any areas of open skin can increase the risk of a post-operative wound infection by allowing bacteria to enter and travel throughout the body.  Notify your surgeon if you have any skin wounds / rashes even if it is not near the expected surgical site.  The area will need assessed to determine if surgery should be delayed until it is healed.  The night prior to surgery shower using a fresh bar of anti-bacterial soap (such as Dial) and clean washcloth.  Sleep in a clean bed with clean clothing.  Do not allow pets to sleep with you.  Shower on the morning of surgery using a fresh bar of anti-bacterial soap (such as Dial) and clean washcloth.  Dry with a clean towel and dress in clean clothing.  Ask your surgeon if you will be receiving antibiotics prior to surgery.  Make sure you, your family, and all healthcare providers clean their hands with soap and water or an alcohol based hand  before caring for you or your wound.    Day of surgery:  Your arrival time is approximately two hours before your scheduled surgery time.  Upon arrival, a Pre-op nurse and Anesthesiologist will review your health history, obtain vital signs, and answer questions you may have.  The only belongings needed at this time will be a list of your home medications and if applicable your C-PAP/BI-PAP machine.  A Pre-op nurse will start an IV and you may receive medication in preparation for surgery, including something to help you relax.     Please be aware that surgery does come with discomfort.  We want to make every effort to control your discomfort so please discuss any uncontrolled symptoms with your nurse.   Your doctor will most likely have prescribed pain medications.      If you are going home after surgery you will receive individualized written care instructions before being discharged.  A responsible adult must drive you to and from the  hospital on the day of your surgery and stay with you for 24 hours.  Discharge prescriptions can be filled by the hospital pharmacy during regular pharmacy hours.  If you are having surgery late in the day/evening your prescription may be e-prescribed to your pharmacy.  Please verify your pharmacy hours or chose a 24 hour pharmacy to avoid not having access to your prescription because your pharmacy has closed for the day.    If you are staying overnight following surgery, you will be transported to your hospital room following the recovery period.  [facility] has all private rooms.    If you have any questions please call Pre-Admission Testing at [phone].  Deductibles and co-payments are collected on the day of service. Please be prepared to pay the required co-pay, deductible or deposit on the day of service as defined by your plan.    Call your surgeon immediately if you experience any of the following symptoms:  Sore Throat  Shortness of Breath or difficulty breathing  Cough  Chills  Body soreness or muscle pain  Headache  Fever  New loss of taste or smell  Do not arrive for your surgery ill.  Your procedure will need to be rescheduled to another time.  You will need to call your physician before the day of surgery to avoid any unnecessary exposure to hospital staff as well as other patients.     CHLORHEXIDINE CLOTH INSTRUCTIONS  The morning of surgery follow these instructions using the Chlorhexidine cloths you've been given.  These steps reduce bacteria on the body.  Do not use the cloths near your eyes, ears mouth, genitalia or on open wounds.  Throw the cloths away after use but do not try to flush them down a toilet.      Open and remove one cloth at a time from the package.    Leave the cloth unfolded and begin the bathing.  Massage the skin with the cloths using gentle pressure to remove bacteria.  Do not scrub harshly.   Follow the steps below with one 2% CHG cloth per area (6 total  cloths).  One cloth for neck, shoulders and chest.  One cloth for both arms, hands, fingers and underarms (do underarms last).  One cloth for the abdomen followed by groin.  One cloth for right leg and foot including between the toes.  One cloth for left leg and foot including between the toes.  The last cloth is to be used for the back of the neck, back and buttocks.    Allow the CHG to air dry 3 minutes on the skin which will give it time to work and decrease the chance of irritation.  The skin may feel sticky until it is dry.  Do not rinse with water or any other liquid or you will lose the beneficial effects of the CHG.  If mild skin irritation occurs, do rinse the skin to remove the CHG.  Report this to the nurse at time of admission.  Do not apply lotions, creams, ointments, deodorants or perfumes after using the clothes. Dress in clean clothes before coming to the hospital.

## 2022-12-06 ENCOUNTER — HOSPITAL ENCOUNTER (OUTPATIENT)
Facility: HOSPITAL | Age: 67
Discharge: HOME | End: 2022-12-07
Attending: ORTHOPAEDIC SURGERY | Admitting: ORTHOPAEDIC SURGERY

## 2022-12-06 ENCOUNTER — APPOINTMENT (OUTPATIENT)
Dept: GENERAL RADIOLOGY | Facility: HOSPITAL | Age: 67
End: 2022-12-06

## 2022-12-06 ENCOUNTER — ANESTHESIA (OUTPATIENT)
Dept: PERIOP | Facility: HOSPITAL | Age: 67
End: 2022-12-06

## 2022-12-06 ENCOUNTER — ANESTHESIA EVENT (OUTPATIENT)
Dept: PERIOP | Facility: HOSPITAL | Age: 67
End: 2022-12-06

## 2022-12-06 DIAGNOSIS — M17.12 PRIMARY OSTEOARTHRITIS OF LEFT KNEE: Primary | ICD-10-CM

## 2022-12-06 PROCEDURE — G0378 HOSPITAL OBSERVATION PER HR: HCPCS

## 2022-12-06 PROCEDURE — 73560 X-RAY EXAM OF KNEE 1 OR 2: CPT

## 2022-12-06 PROCEDURE — 25010000002 EPINEPHRINE 1 MG/ML SOLUTION 30 ML VIAL: Performed by: ORTHOPAEDIC SURGERY

## 2022-12-06 PROCEDURE — 25010000002 FENTANYL CITRATE (PF) 50 MCG/ML SOLUTION: Performed by: ANESTHESIOLOGY

## 2022-12-06 PROCEDURE — 25010000002 CEFAZOLIN IN DEXTROSE 2-4 GM/100ML-% SOLUTION: Performed by: NURSE ANESTHETIST, CERTIFIED REGISTERED

## 2022-12-06 PROCEDURE — 25010000002 DEXAMETHASONE SODIUM PHOSPHATE 20 MG/5ML SOLUTION: Performed by: NURSE ANESTHETIST, CERTIFIED REGISTERED

## 2022-12-06 PROCEDURE — 25010000002 ONDANSETRON PER 1 MG: Performed by: NURSE ANESTHETIST, CERTIFIED REGISTERED

## 2022-12-06 PROCEDURE — 25010000002 KETOROLAC TROMETHAMINE PER 15 MG: Performed by: ORTHOPAEDIC SURGERY

## 2022-12-06 PROCEDURE — 25010000002 ROPIVACAINE PER 1 MG: Performed by: ORTHOPAEDIC SURGERY

## 2022-12-06 PROCEDURE — C1776 JOINT DEVICE (IMPLANTABLE): HCPCS | Performed by: ORTHOPAEDIC SURGERY

## 2022-12-06 PROCEDURE — 25010000002 ROPIVACAINE PER 1 MG: Performed by: ANESTHESIOLOGY

## 2022-12-06 PROCEDURE — 25010000002 FENTANYL CITRATE (PF) 100 MCG/2ML SOLUTION: Performed by: NURSE ANESTHETIST, CERTIFIED REGISTERED

## 2022-12-06 PROCEDURE — 25010000002 CEFAZOLIN IN DEXTROSE 2-4 GM/100ML-% SOLUTION: Performed by: ORTHOPAEDIC SURGERY

## 2022-12-06 PROCEDURE — 25010000002 PROPOFOL 10 MG/ML EMULSION: Performed by: NURSE ANESTHETIST, CERTIFIED REGISTERED

## 2022-12-06 PROCEDURE — 76942 ECHO GUIDE FOR BIOPSY: CPT | Performed by: ORTHOPAEDIC SURGERY

## 2022-12-06 PROCEDURE — C1713 ANCHOR/SCREW BN/BN,TIS/BN: HCPCS | Performed by: ORTHOPAEDIC SURGERY

## 2022-12-06 PROCEDURE — 25010000002 HYDROMORPHONE 1 MG/ML SOLUTION: Performed by: NURSE ANESTHETIST, CERTIFIED REGISTERED

## 2022-12-06 PROCEDURE — 25010000002 MORPHINE PER 10 MG: Performed by: ORTHOPAEDIC SURGERY

## 2022-12-06 PROCEDURE — 97162 PT EVAL MOD COMPLEX 30 MIN: CPT

## 2022-12-06 PROCEDURE — 25010000002 NEOSTIGMINE 5 MG/10ML SOLUTION: Performed by: NURSE ANESTHETIST, CERTIFIED REGISTERED

## 2022-12-06 PROCEDURE — 97530 THERAPEUTIC ACTIVITIES: CPT

## 2022-12-06 DEVICE — CMT BONE R 1X40: Type: IMPLANTABLE DEVICE | Site: KNEE | Status: FUNCTIONAL

## 2022-12-06 DEVICE — IMPLANTABLE DEVICE
Type: IMPLANTABLE DEVICE | Site: KNEE | Status: FUNCTIONAL
Brand: BIOMET® KNEE SYSTEM

## 2022-12-06 DEVICE — IMPLANTABLE DEVICE
Type: IMPLANTABLE DEVICE | Site: KNEE | Status: FUNCTIONAL
Brand: BIOMET KNEE SYSTEM

## 2022-12-06 DEVICE — CAP TOTL KN CMT PRIMARY: Type: IMPLANTABLE DEVICE | Site: KNEE | Status: FUNCTIONAL

## 2022-12-06 DEVICE — IMPLANTABLE DEVICE
Type: IMPLANTABLE DEVICE | Site: KNEE | Status: FUNCTIONAL
Brand: VANGUARD® KNEE SYSTEM

## 2022-12-06 DEVICE — PAT 3PEG THN 34X7.8 34MM: Type: IMPLANTABLE DEVICE | Site: KNEE | Status: FUNCTIONAL

## 2022-12-06 RX ORDER — AMLODIPINE BESYLATE 5 MG/1
5 TABLET ORAL EVERY MORNING
Status: DISCONTINUED | OUTPATIENT
Start: 2022-12-07 | End: 2022-12-07 | Stop reason: HOSPADM

## 2022-12-06 RX ORDER — SODIUM CHLORIDE 0.9 % (FLUSH) 0.9 %
1-10 SYRINGE (ML) INJECTION AS NEEDED
Status: DISCONTINUED | OUTPATIENT
Start: 2022-12-06 | End: 2022-12-07 | Stop reason: HOSPADM

## 2022-12-06 RX ORDER — OXYCODONE AND ACETAMINOPHEN 7.5; 325 MG/1; MG/1
1 TABLET ORAL EVERY 4 HOURS PRN
Status: DISCONTINUED | OUTPATIENT
Start: 2022-12-06 | End: 2022-12-06 | Stop reason: HOSPADM

## 2022-12-06 RX ORDER — CEFAZOLIN SODIUM 2 G/100ML
INJECTION, SOLUTION INTRAVENOUS AS NEEDED
Status: DISCONTINUED | OUTPATIENT
Start: 2022-12-06 | End: 2022-12-06 | Stop reason: SURG

## 2022-12-06 RX ORDER — DIPHENHYDRAMINE HCL 25 MG
25 CAPSULE ORAL EVERY 6 HOURS PRN
Status: DISCONTINUED | OUTPATIENT
Start: 2022-12-06 | End: 2022-12-07 | Stop reason: HOSPADM

## 2022-12-06 RX ORDER — FLUMAZENIL 0.1 MG/ML
0.2 INJECTION INTRAVENOUS AS NEEDED
Status: DISCONTINUED | OUTPATIENT
Start: 2022-12-06 | End: 2022-12-06 | Stop reason: HOSPADM

## 2022-12-06 RX ORDER — PROMETHAZINE HYDROCHLORIDE 25 MG/1
25 TABLET ORAL ONCE AS NEEDED
Status: DISCONTINUED | OUTPATIENT
Start: 2022-12-06 | End: 2022-12-06 | Stop reason: HOSPADM

## 2022-12-06 RX ORDER — EPHEDRINE SULFATE 50 MG/ML
5 INJECTION, SOLUTION INTRAVENOUS ONCE AS NEEDED
Status: DISCONTINUED | OUTPATIENT
Start: 2022-12-06 | End: 2022-12-06 | Stop reason: HOSPADM

## 2022-12-06 RX ORDER — ONDANSETRON 2 MG/ML
INJECTION INTRAMUSCULAR; INTRAVENOUS AS NEEDED
Status: DISCONTINUED | OUTPATIENT
Start: 2022-12-06 | End: 2022-12-06 | Stop reason: SURG

## 2022-12-06 RX ORDER — DEXAMETHASONE SODIUM PHOSPHATE 4 MG/ML
INJECTION, SOLUTION INTRA-ARTICULAR; INTRALESIONAL; INTRAMUSCULAR; INTRAVENOUS; SOFT TISSUE AS NEEDED
Status: DISCONTINUED | OUTPATIENT
Start: 2022-12-06 | End: 2022-12-06 | Stop reason: SURG

## 2022-12-06 RX ORDER — FERROUS SULFATE 325(65) MG
325 TABLET ORAL
Status: DISCONTINUED | OUTPATIENT
Start: 2022-12-06 | End: 2022-12-07 | Stop reason: HOSPADM

## 2022-12-06 RX ORDER — CEFAZOLIN SODIUM 2 G/100ML
2 INJECTION, SOLUTION INTRAVENOUS ONCE
Status: COMPLETED | OUTPATIENT
Start: 2022-12-06 | End: 2022-12-06

## 2022-12-06 RX ORDER — NEOSTIGMINE METHYLSULFATE 0.5 MG/ML
INJECTION, SOLUTION INTRAVENOUS AS NEEDED
Status: DISCONTINUED | OUTPATIENT
Start: 2022-12-06 | End: 2022-12-06 | Stop reason: SURG

## 2022-12-06 RX ORDER — CETIRIZINE HYDROCHLORIDE 10 MG/1
10 TABLET ORAL DAILY
Status: DISCONTINUED | OUTPATIENT
Start: 2022-12-06 | End: 2022-12-07 | Stop reason: HOSPADM

## 2022-12-06 RX ORDER — SODIUM CHLORIDE 0.9 % (FLUSH) 0.9 %
10 SYRINGE (ML) INJECTION EVERY 12 HOURS SCHEDULED
Status: DISCONTINUED | OUTPATIENT
Start: 2022-12-06 | End: 2022-12-07 | Stop reason: HOSPADM

## 2022-12-06 RX ORDER — ACETAMINOPHEN 325 MG/1
325 TABLET ORAL EVERY 4 HOURS PRN
Status: DISCONTINUED | OUTPATIENT
Start: 2022-12-06 | End: 2022-12-07 | Stop reason: HOSPADM

## 2022-12-06 RX ORDER — NALOXONE HCL 0.4 MG/ML
0.2 VIAL (ML) INJECTION AS NEEDED
Status: DISCONTINUED | OUTPATIENT
Start: 2022-12-06 | End: 2022-12-06 | Stop reason: HOSPADM

## 2022-12-06 RX ORDER — ALLOPURINOL 300 MG/1
300 TABLET ORAL DAILY
Status: DISCONTINUED | OUTPATIENT
Start: 2022-12-06 | End: 2022-12-07 | Stop reason: HOSPADM

## 2022-12-06 RX ORDER — NALOXONE HCL 0.4 MG/ML
0.4 VIAL (ML) INJECTION
Status: DISCONTINUED | OUTPATIENT
Start: 2022-12-06 | End: 2022-12-07 | Stop reason: HOSPADM

## 2022-12-06 RX ORDER — CLINDAMYCIN PHOSPHATE 900 MG/50ML
900 INJECTION INTRAVENOUS EVERY 8 HOURS
Status: COMPLETED | OUTPATIENT
Start: 2022-12-06 | End: 2022-12-07

## 2022-12-06 RX ORDER — CELECOXIB 200 MG/1
200 CAPSULE ORAL ONCE
Status: COMPLETED | OUTPATIENT
Start: 2022-12-06 | End: 2022-12-06

## 2022-12-06 RX ORDER — MAGNESIUM HYDROXIDE 1200 MG/15ML
LIQUID ORAL AS NEEDED
Status: DISCONTINUED | OUTPATIENT
Start: 2022-12-06 | End: 2022-12-06 | Stop reason: HOSPADM

## 2022-12-06 RX ORDER — DOCUSATE SODIUM 100 MG/1
100 CAPSULE, LIQUID FILLED ORAL 2 TIMES DAILY PRN
Status: DISCONTINUED | OUTPATIENT
Start: 2022-12-06 | End: 2022-12-07 | Stop reason: HOSPADM

## 2022-12-06 RX ORDER — DIPHENHYDRAMINE HCL 25 MG
25 CAPSULE ORAL
Status: DISCONTINUED | OUTPATIENT
Start: 2022-12-06 | End: 2022-12-06 | Stop reason: HOSPADM

## 2022-12-06 RX ORDER — ASPIRIN 325 MG
325 TABLET, DELAYED RELEASE (ENTERIC COATED) ORAL 2 TIMES DAILY WITH MEALS
Status: DISCONTINUED | OUTPATIENT
Start: 2022-12-07 | End: 2022-12-07 | Stop reason: HOSPADM

## 2022-12-06 RX ORDER — LIDOCAINE HYDROCHLORIDE 20 MG/ML
INJECTION, SOLUTION EPIDURAL; INFILTRATION; INTRACAUDAL; PERINEURAL AS NEEDED
Status: DISCONTINUED | OUTPATIENT
Start: 2022-12-06 | End: 2022-12-06 | Stop reason: SURG

## 2022-12-06 RX ORDER — DIPHENHYDRAMINE HYDROCHLORIDE 50 MG/ML
12.5 INJECTION INTRAMUSCULAR; INTRAVENOUS
Status: DISCONTINUED | OUTPATIENT
Start: 2022-12-06 | End: 2022-12-06 | Stop reason: HOSPADM

## 2022-12-06 RX ORDER — SODIUM CHLORIDE 0.9 % (FLUSH) 0.9 %
3 SYRINGE (ML) INJECTION EVERY 12 HOURS SCHEDULED
Status: DISCONTINUED | OUTPATIENT
Start: 2022-12-06 | End: 2022-12-06 | Stop reason: HOSPADM

## 2022-12-06 RX ORDER — KETOROLAC TROMETHAMINE 30 MG/ML
30 INJECTION, SOLUTION INTRAMUSCULAR; INTRAVENOUS EVERY 6 HOURS PRN
Status: DISCONTINUED | OUTPATIENT
Start: 2022-12-06 | End: 2022-12-07 | Stop reason: HOSPADM

## 2022-12-06 RX ORDER — LISINOPRIL 20 MG/1
20 TABLET ORAL
Status: DISCONTINUED | OUTPATIENT
Start: 2022-12-06 | End: 2022-12-07 | Stop reason: HOSPADM

## 2022-12-06 RX ORDER — HYDROCHLOROTHIAZIDE 12.5 MG/1
12.5 TABLET ORAL
Status: DISCONTINUED | OUTPATIENT
Start: 2022-12-06 | End: 2022-12-07 | Stop reason: HOSPADM

## 2022-12-06 RX ORDER — PANTOPRAZOLE SODIUM 40 MG/1
40 TABLET, DELAYED RELEASE ORAL EVERY MORNING
Status: DISCONTINUED | OUTPATIENT
Start: 2022-12-07 | End: 2022-12-07 | Stop reason: HOSPADM

## 2022-12-06 RX ORDER — DIAZEPAM 5 MG/1
5 TABLET ORAL 2 TIMES DAILY PRN
Status: DISCONTINUED | OUTPATIENT
Start: 2022-12-06 | End: 2022-12-07 | Stop reason: HOSPADM

## 2022-12-06 RX ORDER — HYDROMORPHONE HYDROCHLORIDE 1 MG/ML
0.5 INJECTION, SOLUTION INTRAMUSCULAR; INTRAVENOUS; SUBCUTANEOUS
Status: DISCONTINUED | OUTPATIENT
Start: 2022-12-06 | End: 2022-12-06 | Stop reason: HOSPADM

## 2022-12-06 RX ORDER — ONDANSETRON 2 MG/ML
4 INJECTION INTRAMUSCULAR; INTRAVENOUS EVERY 6 HOURS PRN
Status: DISCONTINUED | OUTPATIENT
Start: 2022-12-06 | End: 2022-12-07 | Stop reason: HOSPADM

## 2022-12-06 RX ORDER — ASPIRIN 325 MG
325 TABLET, DELAYED RELEASE (ENTERIC COATED) ORAL 2 TIMES DAILY WITH MEALS
Qty: 60 TABLET | Refills: 0 | Status: SHIPPED | OUTPATIENT
Start: 2022-12-07

## 2022-12-06 RX ORDER — ONDANSETRON 2 MG/ML
4 INJECTION INTRAMUSCULAR; INTRAVENOUS ONCE AS NEEDED
Status: DISCONTINUED | OUTPATIENT
Start: 2022-12-06 | End: 2022-12-06 | Stop reason: HOSPADM

## 2022-12-06 RX ORDER — HYDRALAZINE HYDROCHLORIDE 20 MG/ML
5 INJECTION INTRAMUSCULAR; INTRAVENOUS
Status: DISCONTINUED | OUTPATIENT
Start: 2022-12-06 | End: 2022-12-06 | Stop reason: HOSPADM

## 2022-12-06 RX ORDER — OXYCODONE HYDROCHLORIDE AND ACETAMINOPHEN 5; 325 MG/1; MG/1
1 TABLET ORAL EVERY 4 HOURS PRN
Status: DISCONTINUED | OUTPATIENT
Start: 2022-12-06 | End: 2022-12-07 | Stop reason: HOSPADM

## 2022-12-06 RX ORDER — MIDAZOLAM HYDROCHLORIDE 1 MG/ML
2 INJECTION INTRAMUSCULAR; INTRAVENOUS
Status: DISCONTINUED | OUTPATIENT
Start: 2022-12-06 | End: 2022-12-06 | Stop reason: HOSPADM

## 2022-12-06 RX ORDER — FENTANYL CITRATE 50 UG/ML
50 INJECTION, SOLUTION INTRAMUSCULAR; INTRAVENOUS
Status: DISCONTINUED | OUTPATIENT
Start: 2022-12-06 | End: 2022-12-06 | Stop reason: HOSPADM

## 2022-12-06 RX ORDER — HYDROCODONE BITARTRATE AND ACETAMINOPHEN 7.5; 325 MG/1; MG/1
1 TABLET ORAL ONCE AS NEEDED
Status: DISCONTINUED | OUTPATIENT
Start: 2022-12-06 | End: 2022-12-06 | Stop reason: HOSPADM

## 2022-12-06 RX ORDER — LABETALOL HYDROCHLORIDE 5 MG/ML
5 INJECTION, SOLUTION INTRAVENOUS
Status: DISCONTINUED | OUTPATIENT
Start: 2022-12-06 | End: 2022-12-06 | Stop reason: HOSPADM

## 2022-12-06 RX ORDER — POVIDONE-IODINE 10 MG/ML
1 SOLUTION TOPICAL AS NEEDED
Status: DISCONTINUED | OUTPATIENT
Start: 2022-12-06 | End: 2022-12-06 | Stop reason: HOSPADM

## 2022-12-06 RX ORDER — OXYCODONE HYDROCHLORIDE AND ACETAMINOPHEN 5; 325 MG/1; MG/1
2 TABLET ORAL EVERY 4 HOURS PRN
Status: DISCONTINUED | OUTPATIENT
Start: 2022-12-06 | End: 2022-12-07 | Stop reason: HOSPADM

## 2022-12-06 RX ORDER — DOCUSATE SODIUM 250 MG
250 CAPSULE ORAL 2 TIMES DAILY PRN
Qty: 30 CAPSULE | Refills: 1 | Status: SHIPPED | OUTPATIENT
Start: 2022-12-06

## 2022-12-06 RX ORDER — DIPHENHYDRAMINE HYDROCHLORIDE 50 MG/ML
12.5 INJECTION INTRAMUSCULAR; INTRAVENOUS EVERY 6 HOURS PRN
Status: DISCONTINUED | OUTPATIENT
Start: 2022-12-06 | End: 2022-12-07 | Stop reason: HOSPADM

## 2022-12-06 RX ORDER — MORPHINE SULFATE 2 MG/ML
4 INJECTION, SOLUTION INTRAMUSCULAR; INTRAVENOUS
Status: DISCONTINUED | OUTPATIENT
Start: 2022-12-06 | End: 2022-12-07 | Stop reason: HOSPADM

## 2022-12-06 RX ORDER — GLYCOPYRROLATE 0.2 MG/ML
INJECTION INTRAMUSCULAR; INTRAVENOUS AS NEEDED
Status: DISCONTINUED | OUTPATIENT
Start: 2022-12-06 | End: 2022-12-06 | Stop reason: SURG

## 2022-12-06 RX ORDER — PROMETHAZINE HYDROCHLORIDE 12.5 MG/1
12.5 TABLET ORAL EVERY 6 HOURS PRN
Status: DISCONTINUED | OUTPATIENT
Start: 2022-12-06 | End: 2022-12-07 | Stop reason: HOSPADM

## 2022-12-06 RX ORDER — FINASTERIDE 5 MG/1
5 TABLET, FILM COATED ORAL DAILY
Status: DISCONTINUED | OUTPATIENT
Start: 2022-12-06 | End: 2022-12-07 | Stop reason: HOSPADM

## 2022-12-06 RX ORDER — ROPIVACAINE HYDROCHLORIDE 5 MG/ML
INJECTION, SOLUTION EPIDURAL; INFILTRATION; PERINEURAL
Status: COMPLETED | OUTPATIENT
Start: 2022-12-06 | End: 2022-12-06

## 2022-12-06 RX ORDER — SODIUM CHLORIDE, SODIUM LACTATE, POTASSIUM CHLORIDE, CALCIUM CHLORIDE 600; 310; 30; 20 MG/100ML; MG/100ML; MG/100ML; MG/100ML
9 INJECTION, SOLUTION INTRAVENOUS CONTINUOUS
Status: DISCONTINUED | OUTPATIENT
Start: 2022-12-06 | End: 2022-12-07 | Stop reason: HOSPADM

## 2022-12-06 RX ORDER — CHOLECALCIFEROL (VITAMIN D3) 125 MCG
5 CAPSULE ORAL NIGHTLY PRN
Status: DISCONTINUED | OUTPATIENT
Start: 2022-12-06 | End: 2022-12-07 | Stop reason: HOSPADM

## 2022-12-06 RX ORDER — TRANEXAMIC ACID 100 MG/ML
INJECTION, SOLUTION INTRAVENOUS AS NEEDED
Status: DISCONTINUED | OUTPATIENT
Start: 2022-12-06 | End: 2022-12-06 | Stop reason: SURG

## 2022-12-06 RX ORDER — FENTANYL CITRATE 50 UG/ML
INJECTION, SOLUTION INTRAMUSCULAR; INTRAVENOUS AS NEEDED
Status: DISCONTINUED | OUTPATIENT
Start: 2022-12-06 | End: 2022-12-06 | Stop reason: SURG

## 2022-12-06 RX ORDER — FENTANYL CITRATE 50 UG/ML
100 INJECTION, SOLUTION INTRAMUSCULAR; INTRAVENOUS
Status: DISCONTINUED | OUTPATIENT
Start: 2022-12-06 | End: 2022-12-06 | Stop reason: HOSPADM

## 2022-12-06 RX ORDER — ROCURONIUM BROMIDE 10 MG/ML
INJECTION, SOLUTION INTRAVENOUS AS NEEDED
Status: DISCONTINUED | OUTPATIENT
Start: 2022-12-06 | End: 2022-12-06 | Stop reason: SURG

## 2022-12-06 RX ORDER — PROMETHAZINE HYDROCHLORIDE 25 MG/1
25 SUPPOSITORY RECTAL ONCE AS NEEDED
Status: DISCONTINUED | OUTPATIENT
Start: 2022-12-06 | End: 2022-12-06 | Stop reason: HOSPADM

## 2022-12-06 RX ORDER — SODIUM CHLORIDE 0.9 % (FLUSH) 0.9 %
3-10 SYRINGE (ML) INJECTION AS NEEDED
Status: DISCONTINUED | OUTPATIENT
Start: 2022-12-06 | End: 2022-12-06 | Stop reason: HOSPADM

## 2022-12-06 RX ORDER — ACETAMINOPHEN 500 MG
500 TABLET ORAL EVERY 6 HOURS PRN
Status: DISCONTINUED | OUTPATIENT
Start: 2022-12-06 | End: 2022-12-07 | Stop reason: HOSPADM

## 2022-12-06 RX ORDER — PROPOFOL 10 MG/ML
VIAL (ML) INTRAVENOUS AS NEEDED
Status: DISCONTINUED | OUTPATIENT
Start: 2022-12-06 | End: 2022-12-06 | Stop reason: SURG

## 2022-12-06 RX ORDER — ACETAMINOPHEN 500 MG
1000 TABLET ORAL ONCE
Status: COMPLETED | OUTPATIENT
Start: 2022-12-06 | End: 2022-12-06

## 2022-12-06 RX ORDER — LIDOCAINE HYDROCHLORIDE 10 MG/ML
0.5 INJECTION, SOLUTION EPIDURAL; INFILTRATION; INTRACAUDAL; PERINEURAL ONCE AS NEEDED
Status: DISCONTINUED | OUTPATIENT
Start: 2022-12-06 | End: 2022-12-06 | Stop reason: HOSPADM

## 2022-12-06 RX ORDER — ONDANSETRON 4 MG/1
4 TABLET, FILM COATED ORAL EVERY 6 HOURS PRN
Status: DISCONTINUED | OUTPATIENT
Start: 2022-12-06 | End: 2022-12-07 | Stop reason: HOSPADM

## 2022-12-06 RX ORDER — OXYCODONE HYDROCHLORIDE AND ACETAMINOPHEN 5; 325 MG/1; MG/1
1-2 TABLET ORAL EVERY 4 HOURS PRN
Qty: 60 TABLET | Refills: 0 | Status: SHIPPED | OUTPATIENT
Start: 2022-12-06

## 2022-12-06 RX ORDER — SODIUM CHLORIDE 9 MG/ML
40 INJECTION, SOLUTION INTRAVENOUS AS NEEDED
Status: DISCONTINUED | OUTPATIENT
Start: 2022-12-06 | End: 2022-12-07 | Stop reason: HOSPADM

## 2022-12-06 RX ORDER — SODIUM CHLORIDE 450 MG/100ML
100 INJECTION, SOLUTION INTRAVENOUS CONTINUOUS
Status: DISCONTINUED | OUTPATIENT
Start: 2022-12-06 | End: 2022-12-07 | Stop reason: HOSPADM

## 2022-12-06 RX ADMIN — CEFAZOLIN SODIUM 2 G: 2 INJECTION, SOLUTION INTRAVENOUS at 08:42

## 2022-12-06 RX ADMIN — CLINDAMYCIN PHOSPHATE 900 MG: 900 INJECTION, SOLUTION INTRAVENOUS at 23:24

## 2022-12-06 RX ADMIN — SODIUM CHLORIDE, POTASSIUM CHLORIDE, SODIUM LACTATE AND CALCIUM CHLORIDE: 600; 310; 30; 20 INJECTION, SOLUTION INTRAVENOUS at 09:52

## 2022-12-06 RX ADMIN — SODIUM CHLORIDE, POTASSIUM CHLORIDE, SODIUM LACTATE AND CALCIUM CHLORIDE 9 ML/HR: 600; 310; 30; 20 INJECTION, SOLUTION INTRAVENOUS at 07:46

## 2022-12-06 RX ADMIN — MUPIROCIN 1 APPLICATION: 20 OINTMENT TOPICAL at 14:20

## 2022-12-06 RX ADMIN — HYDROCHLOROTHIAZIDE 12.5 MG: 12.5 TABLET ORAL at 15:33

## 2022-12-06 RX ADMIN — FENTANYL CITRATE 50 MCG: 50 INJECTION, SOLUTION INTRAMUSCULAR; INTRAVENOUS at 09:12

## 2022-12-06 RX ADMIN — CEFAZOLIN SODIUM 2 G: 2 INJECTION, SOLUTION INTRAVENOUS at 10:28

## 2022-12-06 RX ADMIN — ONDANSETRON 4 MG: 2 INJECTION INTRAMUSCULAR; INTRAVENOUS at 10:51

## 2022-12-06 RX ADMIN — TRANEXAMIC ACID 1000 MG: 1 INJECTION, SOLUTION INTRAVENOUS at 09:13

## 2022-12-06 RX ADMIN — Medication 10 ML: at 15:33

## 2022-12-06 RX ADMIN — ALLOPURINOL 300 MG: 300 TABLET ORAL at 15:33

## 2022-12-06 RX ADMIN — HYDROMORPHONE HYDROCHLORIDE 0.5 MG: 1 INJECTION, SOLUTION INTRAMUSCULAR; INTRAVENOUS; SUBCUTANEOUS at 09:38

## 2022-12-06 RX ADMIN — ROPIVACAINE HYDROCHLORIDE 20 ML: 5 INJECTION, SOLUTION EPIDURAL; INFILTRATION; PERINEURAL at 08:05

## 2022-12-06 RX ADMIN — DEXAMETHASONE SODIUM PHOSPHATE 8 MG: 4 INJECTION, SOLUTION INTRAMUSCULAR; INTRAVENOUS at 08:52

## 2022-12-06 RX ADMIN — MUPIROCIN 1 APPLICATION: 20 OINTMENT TOPICAL at 20:39

## 2022-12-06 RX ADMIN — PROPOFOL 200 MG: 10 INJECTION, EMULSION INTRAVENOUS at 08:52

## 2022-12-06 RX ADMIN — ROCURONIUM BROMIDE 50 MG: 10 INJECTION, SOLUTION INTRAVENOUS at 08:52

## 2022-12-06 RX ADMIN — NEOSTIGMINE METHYLSULFATE 4 MG: 0.5 INJECTION INTRAVENOUS at 10:41

## 2022-12-06 RX ADMIN — CELECOXIB 200 MG: 200 CAPSULE ORAL at 08:05

## 2022-12-06 RX ADMIN — ACETAMINOPHEN 1000 MG: 500 TABLET ORAL at 07:45

## 2022-12-06 RX ADMIN — FENTANYL CITRATE 50 MCG: 0.05 INJECTION, SOLUTION INTRAMUSCULAR; INTRAVENOUS at 07:57

## 2022-12-06 RX ADMIN — GLYCOPYRROLATE 0.4 MCG: 1 INJECTION INTRAMUSCULAR; INTRAVENOUS at 10:41

## 2022-12-06 RX ADMIN — LIDOCAINE HYDROCHLORIDE 100 MG: 20 INJECTION, SOLUTION EPIDURAL; INFILTRATION; INTRACAUDAL; PERINEURAL at 08:52

## 2022-12-06 RX ADMIN — HYDROMORPHONE HYDROCHLORIDE 0.5 MG: 1 INJECTION, SOLUTION INTRAMUSCULAR; INTRAVENOUS; SUBCUTANEOUS at 09:45

## 2022-12-06 RX ADMIN — FERROUS SULFATE TAB 325 MG (65 MG ELEMENTAL FE) 325 MG: 325 (65 FE) TAB at 14:20

## 2022-12-06 RX ADMIN — POVIDONE-IODINE 1 EACH: 10 SOLUTION TOPICAL at 08:09

## 2022-12-06 RX ADMIN — FENTANYL CITRATE 50 MCG: 50 INJECTION, SOLUTION INTRAMUSCULAR; INTRAVENOUS at 08:52

## 2022-12-06 RX ADMIN — FINASTERIDE 5 MG: 5 TABLET, FILM COATED ORAL at 15:34

## 2022-12-06 RX ADMIN — CLINDAMYCIN PHOSPHATE 900 MG: 900 INJECTION, SOLUTION INTRAVENOUS at 15:33

## 2022-12-06 RX ADMIN — SODIUM CHLORIDE 100 ML/HR: 4.5 INJECTION, SOLUTION INTRAVENOUS at 15:32

## 2022-12-06 NOTE — THERAPY EVALUATION
Patient Name: Kalegih Rizvi  : 1955    MRN: [id number]                              Today's Date: 2022       Admit Date: 2022    Visit Dx:     ICD-10-CM ICD-9-CM   1. Primary osteoarthritis of left knee  M17.12 715.16     Patient Active Problem List   Diagnosis   • Osteoarthritis, knee   • Primary osteoarthritis of left knee     Past Medical History:   Diagnosis Date   • Acid reflux    • Arthritis    • Gout    • Hypertension    • Knee pain 2022    LEFT   • Psoriasis    • Sleep apnea     uses cpap   • Urinary frequency      Past Surgical History:   Procedure Laterality Date   • COLONOSCOPY     • UT TOTAL KNEE ARTHROPLASTY Right 2017    Procedure: RT TOTAL KNEE ARTHROPLASTY;  Surgeon: Maikel Doshi MD;  Location: [facility];  Service: Orthopedics      General Information    No documentation.                Mobility    No documentation.                Obj/Interventions    No documentation.                Goals/Plan    No documentation.                Clinical Impression    No documentation.                Outcome Measures     Row Name 22 1233          How much help from another person do you currently need...    Turning from your back to your side while in flat bed without using bedrails? 4  -CS     Moving from lying on back to sitting on the side of a flat bed without bedrails? 4  -CS     Moving to and from a bed to a chair (including a wheelchair)? 3  -CS     Standing up from a chair using your arms (e.g., wheelchair, bedside chair)? 3  -CS     Climbing 3-5 steps with a railing? 3  -CS     To walk in hospital room? 3  -CS     AM-PAC 6 Clicks Score (PT) 20  -CS     Highest level of mobility 6 --> Walked 10 steps or more  -CS           User Key  (r) = Recorded By, (t) = Taken By, (c) = Cosigned By    Initials Name Provider Type    CS Sepideh Miller RN Registered Nurse                               PT Recommendation and Plan           Time Calculation:         PT  G-Codes  AM-PAC 6 Clicks Score (PT): 20       Raisa Foss, PT  12/6/2022

## 2022-12-06 NOTE — PROGRESS NOTES
Continued Stay Note  [facility]     Patient Name: Kaleigh Rizvi  MRN: [id number]  Today's Date: 12/6/2022    Admit Date: 12/6/2022    Plan: [facility]   Discharge Plan     Row Name 12/06/22 1311       Plan    Plan [facility]    Patient/Family in Agreement with Plan yes    Plan Comments Spoke with pt, verified correct information on facesheet and explained the role of CCP. Pt would like to d/c home with [facility], referral [facility] to [facility] with [facility] who states they are able to accept. Plan will be to d/c home with [facility] and family support.               Discharge Codes    No documentation.                     Yeimy Mccloud RN

## 2022-12-06 NOTE — PLAN OF CARE
Goal Outcome Evaluation:              Outcome Evaluation: Received patient from [facility], Axox4, s/p LT TKA w/ HVAC, on room air, ambulated stretcher to bed,voided per BR, had a blood tinged urine, notified Dr Chaduhary and said just keep on observing next time he voids.Seen by PT today, up on chair,on IV ABT, SCD's on, encouraged IS, on ice pack.Needs attended.Will continue to monitor.Patient has voided for the 2nd time and urine was clear, stood up with legs apart, not touching the HVAC site.

## 2022-12-06 NOTE — ANESTHESIA POSTPROCEDURE EVALUATION
Patient: Kaleigh Rizvi    Procedure Summary     Date: 12/06/22 Room / Location: [facility] OR [address] [place] [address] [facility]    Anesthesia Start: 0847 Anesthesia Stop: 1056    Procedure: LEFT TOTAL KNEE ARTHROPLASTY (Left: Knee) Diagnosis:     Surgeons: David Aguirre MD Provider: Faustino Ordoñez MD    Anesthesia Type: general ASA Status: 3          Anesthesia Type: general    Vitals  Vitals Value Taken Time   /61 12/06/22 1200   Temp 36.6 °C (97.9 °F) 12/06/22 1055   Pulse 57 12/06/22 1201   Resp 16 12/06/22 1145   SpO2 89 % 12/06/22 1201   Vitals shown include unvalidated device data.        Post Anesthesia Care and Evaluation    Patient location during evaluation: bedside  Patient participation: complete - patient participated  Level of consciousness: awake  Pain management: adequate    Airway patency: patent  Anesthetic complications: No anesthetic complications  PONV Status: controlled  Cardiovascular status: acceptable  Respiratory status: acceptable  Hydration status: acceptable    Comments: --------------------            12/06/22               1145     --------------------   BP:       100/64     Pulse:      59       Resp:       16       Temp:                SpO2:      97%      --------------------

## 2022-12-06 NOTE — ANESTHESIA PROCEDURE NOTES
Airway  Urgency: elective    Date/Time: 12/6/2022 8:54 AM  Airway not difficult    General Information and Staff    Patient location during procedure: OR  CRNA/CAA: Roxanne Draper CRNA    Indications and Patient Condition  Indications for airway management: airway protection    Preoxygenated: yes  Mask difficulty assessment: 2 - vent by mask + OA or adjuvant +/- NMBA    Final Airway Details  Final airway type: endotracheal airway      Successful airway: ETT  Cuffed: yes   Successful intubation technique: direct laryngoscopy  Endotracheal tube insertion site: oral  Blade: Elizabeth  Blade size: 2  ETT size (mm): 7.0  Cormack-Lehane Classification: grade I - full view of glottis  Placement verified by: chest auscultation and capnometry   Cuff volume (mL): 8  Number of attempts at approach: 1  Assessment: lips, teeth, and gum same as pre-op and atraumatic intubation    Additional Comments  PreO2 with 100% O2;  FeO2 >85%;  sniff position; easy mask ventilation;  Intubated with no difficultly after eyes taped; Appears atraumatic;  Lips and teeth intact as preop condition;  Airway secured. Connected to ventilator.

## 2022-12-06 NOTE — THERAPY EVALUATION
Patient Name: Kaleigh Rizvi  : 1955    MRN: [id number]                              Today's Date: 2022       Admit Date: 2022    Visit Dx:     ICD-10-CM ICD-9-CM   1. Primary osteoarthritis of left knee  M17.12 715.16     Patient Active Problem List   Diagnosis   • Osteoarthritis, knee   • Primary osteoarthritis of left knee     Past Medical History:   Diagnosis Date   • Acid reflux    • Arthritis    • Gout    • Hypertension    • Knee pain 2022    LEFT   • Psoriasis    • Sleep apnea     uses cpap   • Urinary frequency      Past Surgical History:   Procedure Laterality Date   • COLONOSCOPY     • IL TOTAL KNEE ARTHROPLASTY Right 2017    Procedure: RT TOTAL KNEE ARTHROPLASTY;  Surgeon: Maikel Doshi MD;  Location: [facility];  Service: Orthopedics      General Information     Row Name 22 1600          Physical Therapy Time and Intention    Document Type evaluation  -SV     Mode of Treatment individual therapy;physical therapy  -SV     Row Name 22 1600          General Information    Patient Profile Reviewed yes  -SV     Prior Level of Function independent:  -SV     Existing Precautions/Restrictions no known precautions/restrictions  -SV     Barriers to Rehab none identified  -SV     Row Name 22 1600          Living Environment    People in Home spouse  -SV     Row Name 22 1600          Home Main Entrance    Number of Stairs, Main Entrance five  -SV     Stair Railings, Main Entrance railings on both sides of stairs  -SV     Row Name 22 1600          Cognition    Orientation Status (Cognition) oriented x 4  -SV     Row Name 22 1600          Safety Issues, Functional Mobility    Impairments Affecting Function (Mobility) endurance/activity tolerance;pain;strength;range of motion (ROM)  -SV           User Key  (r) = Recorded By, (t) = Taken By, (c) = Cosigned By    Initials Name Provider Type    SV Raisa Foss, PT Physical Therapist                Mobility     Row Name 12/06/22 1652          Bed Mobility    Comment, (Bed Mobility) NT up in bathroom  -SV     Row Name 12/06/22 1652          Sit-Stand Transfer    Sit-Stand [place] (Transfers) contact guard;verbal cues  -SV     Assistive Device (Sit-Stand Transfers) walker, front-wheeled  -SV     Row Name 12/06/22 1652          Gait/Stairs (Locomotion)    [place] Level (Gait) contact guard;verbal cues  -SV     Assistive Device (Gait) walker, front-wheeled  -SV     Distance in Feet (Gait) 30' antalgic  -SV           User Key  (r) = Recorded By, (t) = Taken By, (c) = Cosigned By    Initials Name Provider Type    SV Raisa Foss, PT Physical Therapist               Obj/Interventions     Row Name 12/06/22 1653          Range of Motion Comprehensive    General Range of Motion no range of motion deficits identified  -SV     Comment, General Range of Motion except left knee -10 to 65 aarom sheet pull  -SV     Row Name 12/06/22 1653          Strength Comprehensive (MMT)    General Manual Muscle Testing (MMT) Assessment no strength deficits identified  -SV     Comment, General Manual Muscle Testing (MMT) Assessment except left knee 3-/5  -SV     Row Name 12/06/22 1653          Motor Skills    Therapeutic Exercise --  10 reps TKA ex reclined with vc  -SV     Row Name 12/06/22 1653          Balance    Comment, Balance indep sit bal, sba standing bal with rwx  -SV     Row Name 12/06/22 1653          Sensory Assessment (Somatosensory)    Sensory Assessment (Somatosensory) not tested  denies numbness LLE  -SV           User Key  (r) = Recorded By, (t) = Taken By, (c) = Cosigned By    Initials Name Provider Type    SV Raisa Foss, PT Physical Therapist               Goals/Plan     Row Name 12/06/22 1656          Transfer Goal 1 (PT)    Activity/Assistive Device (Transfer Goal 1, PT) sit-to-stand/stand-to-sit;walker, rolling  -SV     [place] Level/Cues Needed (Transfer Goal 1, PT) independent  -SV      Time Frame (Transfer Goal 1, PT) by discharge  -SV     Row Name 12/06/22 1656          Gait Training Goal 1 (PT)    Activity/Assistive Device (Gait Training Goal 1, PT) gait (walking locomotion);walker, rolling  -SV     [place] Level (Gait Training Goal 1, PT) independent  -SV     Time Frame (Gait Training Goal 1, PT) by discharge  -     Row Name 12/06/22 1656          Stairs Goal 1 (PT)    Activity/Assistive Device (Stairs Goal 1, PT) ascending stairs;descending stairs  -SV     [place] Level/Cues Needed (Stairs Goal 1, PT) standby assist  -SV     Number of Stairs (Stairs Goal 1, PT) 5  -SV     Time Frame (Stairs Goal 1, PT) by discharge  -     Row Name 12/06/22 1656          Therapy Assessment/Plan (PT)    Planned Therapy Interventions (PT) gait training;home exercise program;patient/family education;stretching;strengthening;stair training;ROM (range of motion);transfer training  -SV           User Key  (r) = Recorded By, (t) = Taken By, (c) = Cosigned By    Initials Name Provider Type    SV Raisa Foss, PT Physical Therapist               Clinical Impression     Row Name 12/06/22 1654          Pain    Pretreatment Pain Rating 3/10  -SV     Posttreatment Pain Rating 3/10  -SV     Pain Location - Side/Orientation Left  -SV     Pain Location - knee  -SV     Row Name 12/06/22 1654          Plan of Care Review    Plan of Care Reviewed With patient;daughter  -SV     Row Name 12/06/22 1654          Therapy Assessment/Plan (PT)    Patient/Family Therapy Goals Statement (PT) painfree left knee  -SV     Rehab Potential (PT) good, to achieve stated therapy goals  -SV     Criteria for Skilled Interventions Met (PT) yes  -SV     Therapy Frequency (PT) daily  -SV     Predicted Duration of Therapy Intervention (PT) 2-5 weeks HHPT/OPPT  -SV     Row Name 12/06/22 1654          Vital Signs    O2 Delivery Pre Treatment room air  -SV     O2 Delivery Intra Treatment room air  -SV     O2 Delivery Post Treatment room  air  -SV     Pre Patient Position Sitting  -SV     Intra Patient Position Standing  -SV     Post Patient Position Sitting  -SV     Row Name 12/06/22 1654          Positioning and Restraints    Pre-Treatment Position bathroom  -SV     Post Treatment Position chair  -SV     In Chair notified nsg;reclined;call light within reach;encouraged to call for assist;exit alarm on;with family/caregiver  -SV           User Key  (r) = Recorded By, (t) = Taken By, (c) = Cosigned By    Initials Name Provider Type     Raisa Foss, PT Physical Therapist               Outcome Measures     Row Name 12/06/22 1656 12/06/22 1233       How much help from another person do you currently need...    Turning from your back to your side while in flat bed without using bedrails? 4  -SV 4  -CS    Moving from lying on back to sitting on the side of a flat bed without bedrails? 4  -SV 4  -CS    Moving to and from a bed to a chair (including a wheelchair)? 3  -SV 3  -CS    Standing up from a chair using your arms (e.g., wheelchair, bedside chair)? 3  -SV 3  -CS    Climbing 3-5 steps with a railing? 3  -SV 3  -CS    To walk in hospital room? 3  -SV 3  -CS    AM-PAC 6 Clicks Score (PT) 20  -SV 20  -CS    Highest level of mobility 6 --> Walked 10 steps or more  -SV 6 --> Walked 10 steps or more  -CS          User Key  (r) = Recorded By, (t) = Taken By, (c) = Cosigned By    Initials Name Provider Type    Raisa Jaimes PT Physical Therapist    CS Sepideh Miller, RN Registered Nurse                             Physical Therapy Education     Title: PT OT SLP Therapies (Done)     Topic: Physical Therapy (Done)     Point: Mobility training (Done)     Learning Progress Summary           Patient Eager, E, VU,NR by  at 12/6/2022 1657                   Point: Home exercise program (Done)     Learning Progress Summary           Patient Eager, E, VU,NR by  at 12/6/2022 1657                               User Key     Initials Effective Dates  Name Provider Type Discipline     06/16/21 -  Raisa Foss, PT Physical Therapist PT              PT Recommendation and Plan  Planned Therapy Interventions (PT): gait training, home exercise program, patient/family education, stretching, strengthening, stair training, ROM (range of motion), transfer training  Plan of Care Reviewed With: patient, daughter     Time Calculation:    PT Charges     Row Name 12/06/22 1657             Time Calculation    Start Time 1600  -      Stop Time 1608  -      Time Calculation (min) 8 min  -      PT Received On 12/06/22  -      PT - Next Appointment 12/07/22  -      PT Goal Re-Cert Due Date 12/13/22  -            User Key  (r) = Recorded By, (t) = Taken By, (c) = Cosigned By    Initials Name Provider Type    SV Raisa Foss, PT Physical Therapist              Therapy Charges for Today     Code Description Service Date Service Provider Modifiers Qty    85902421480 HC PT EVAL MOD COMPLEXITY 2 12/6/2022 Raisa Foss, PT GP 1    92697235099 HC PT THERAPEUTIC ACT EA 15 MIN 12/6/2022 Raisa Foss, PT GP 2          PT G-Codes  AM-PAC 6 Clicks Score (PT): 20  PT Discharge Summary  Anticipated Discharge Disposition (PT): home with home health    Raisa Foss PT  12/6/2022

## 2022-12-06 NOTE — DISCHARGE SUMMARY
Orthopaedic Surgery Discharge Summary    Patient Name:  Kaleigh Rizvi  YOB: 1955  Age: 67 y.o.  Medical Records Number:  [id number]    Date of Admission:  12/6/2022  Date of Discharge:  12/7/2022    Primary Discharge Diagnosis:  Primary osteoarthritis of left knee [M17.12]    Secondary Discharge Diagnosis:    Problems Addressed this Visit        Musculoskeletal and Injuries    * (Principal) Primary osteoarthritis of left knee - Primary    Relevant Medications    oxyCODONE-acetaminophen (PERCOCET) 5-325 MG per tablet   Diagnoses       Codes Comments    Primary osteoarthritis of left knee    -  Primary ICD-10-CM: M17.12  ICD-9-CM: 715.16           Procedures Performed:  Left Total Knee Arthroplasty      Hospital Course:    Kaleigh Rizvi is a 67 y.o.  male who underwent successful left tka on 12/6/2022.  Kaleigh Rizvi was started on Aspirin 325 mg po twice daily post-operatively for DVT prophylaxis.  On post-op day 1 the patients dressing was changed and their incision was clean, with no signs of infection and their calf was soft, with no signs of DVT.  The patient progressed well with physical therapy and the patients hemoglobin remained stable. On post-operative day 1 the patient was felt ready for discharge.     Vitals:  Vitals:    12/06/22 1206 12/06/22 1215 12/06/22 1233 12/06/22 1425   BP:  101/62 104/67 122/76   BP Location:   Left arm Left arm   Patient Position:   Lying Lying   Pulse: 74 70 75 82   Resp: 16 12 16 16   Temp:  97.7 °F (36.5 °C) 97.6 °F (36.4 °C) 97.2 °F (36.2 °C)   TempSrc:  Oral Oral Oral   SpO2: 95% 95% 97% 97%   Weight:       Height:           Discharge Medications:      Discharge Medications      New Medications      Instructions Start Date   docusate sodium 250 MG capsule  Commonly known as: COLACE   250 mg, Oral, 2 Times Daily PRN      oxyCODONE-acetaminophen 5-325 MG per tablet  Commonly known as: PERCOCET   1-2 tablets, Oral, Every 4 Hours PRN         Changes to  Medications      Instructions Start Date   aspirin  MG tablet  What changed:   · medication strength  · how much to take  · when to take this  · additional instructions   325 mg, Oral, 2 Times Daily With Meals   Start Date: December 7, 2022        Continue These Medications      Instructions Start Date   acetaminophen 500 MG tablet  Commonly known as: TYLENOL   500 mg, Oral, Every 6 Hours PRN      allopurinol 300 MG tablet  Commonly known as: ZYLOPRIM   300 mg, Oral, Daily      amLODIPine 5 MG tablet  Commonly known as: NORVASC   5 mg, Oral, Every Morning      dutasteride 0.5 MG capsule  Commonly known as: AVODART   0.5 mg, Oral, Nightly      fexofenadine 180 MG tablet  Commonly known as: ALLEGRA   180 mg, Oral, Daily      lisinopril-hydrochlorothiazide 20-12.5 MG per tablet  Commonly known as: PRINZIDE,ZESTORETIC   1 tablet, Oral, Every Morning      omeprazole 20 MG capsule  Commonly known as: priLOSEC   20 mg, Oral, As Needed         Stop These Medications    Chlorhexidine Gluconate Cloth 2 % pads            Pain Medications:  Percocet 5/325 mg 1-2 po q 4-6 hours prn pain    DVT Prophylaxis:  Enteric Coated Aspirin 325 mg po twice daily for 2 weeks, then one daily for 4 weeks      Total Knee Joint Replacement Discharge Instructions:    I. ACTIVITIES:  1. Exercises:  ? Complete exercise program as taught by the hospital physical therapist 2 times per day  ? Exercise program will be advanced by the physical therapist  ? During the day be up ambulating every 2 hours (while awake) for short distances  ? Complete the ankle pump exercises at least 10 times per hour (while awake)  ? Elevate legs most of the day the first week post operatively and thereafter elevate legs when in bed and for at least 30 minutes during the day. Caution must be taken to avoid pillow placement under the bend of the knee as this can led to flexion contractures of the knee.  ? Use cold packs 20-30 minutes approximately 5 times per day.  This should be done before and after completing your exercises and at any time you are experiencing pain/ stiffness in your operative extremity.      2. Activities of Daily Living:  ? No tub baths, hot tubs, or swimming pools for 4 weeks  ? May shower and let water run over the incision on post-operative day #5 if no drainage. Do not scrub or rub the incision. Simply let the water run over the incision and pat dry.    II. Restrictions  ? Do not cross legs or kneel  ? Your surgeon will discuss with you when you will be able to drive again.  ? Weight bearing is as tolerated  ? First week stay inside on even terrain. May go up and down stairs one stair at a time utilizing the hand rail  ? After one week, you may venture outside.    III. Precautions:  ? Everyone that comes near you should wash their hands  ? No elective dental, genital-urinary, or colon procedures or surgical procedures for 12 weeks after surgery unless absolutely necessary.  ?  If dental work or surgical procedure is deemed absolutely necessary, you will need to contact your surgeon as you will need to take antibiotics 1 hour prior to any dental work (including teeth cleanings).  ? Please discuss with your surgeon prophylactic antibiotics as the length of time this intervention will be necessary for you varies with each patient’s health history and situation.  ? Avoid sick people. If you must be around someone who is ill, they should wear a mask.  ? Avoid visits to the Emergency Room or Urgent Care unless you are having a life threatening event.   ? If ordered stockings are to be placed on in the morning and removed at night. Monitor the stockings to ensure that any swelling is not causing the stockings to become too tight. In this case, remove stockings immediately.    IV. INCISION CARE:  ? Wash your hands prior to dressing changes  ? Change the dressing as needed to keep incision clean and dry. Utilize dry gauze and paper tape. Avoid touching the  side of the gauze that goes against the incision with your hands.  ? No creams or ointments to the incision  ? May remove dressing once the incision is free of drainage  ? Do not touch or pick at the incision  ? Check incision every day and notify surgeon immediately if any of the following signs or symptoms are noted:  o Increase in redness  o Increase in swelling around the incision and of the entire extremity  o Increase in pain  o Drainage oozing from the incision  o Pulling apart of the edges of the incision  o Increase in overall body temperature (greater than 100.5 degrees)  ? Your surgeon will instruct you regarding suture or staple removal    V. Medications:   1. Anticoagulants: You will be discharged on an anticoagulant. This is a prophylactic medication that helps prevent blood clots during your post-operative period. The type and length of dosage varies based on your individual needs, procedure performed, and surgeon’s preference.  ? While taking the anticoagulant, you should avoid taking any additional aspirin, ibuprofen (Advil or Motrin), Aleve (Naprosyn) or other non-steroidal anti-inflammatory medications.   ? Notify surgeon immediately if any arina bleeding is noted in the urine, stool, emesis, or from the nose or the incision. Blood in the stool will often appear as black rather than red. Blood in urine may appear as pink. Blood in emesis may appear as brown/black like coffee grounds.  ? You will need to apply pressure for longer periods of time to any cuts or abrasions to stop bleeding  ? Avoid alcohol while taking anticoagulants    2. Stool Softeners: You will be at greater risk of constipation after surgery due to being less mobile and the pain medications.   ? Take stool softeners as instructed by your surgeon while on pain medications. Over the counter Colace 100 mg 1-2 capsules twice daily.   ? If stools become too loose or too frequent, please decreases the dosage or stop the stool  softener.  ? If constipation occurs despite use of stool softeners, you are to continue the stool softeners and add a laxative (Milk of Magnesia 1 ounce daily as needed)  ? Drink plenty of fluids, and eat fruits and vegetables during your recovery time    3. Pain Medications utilized after surgery are narcotics and the law requires that the following information be given to all patients that are prescribed narcotics:  ? CLASSIFICATION: Pain medications are called Opioids and are narcotics  ? LEGALITIES: It is illegal to share narcotics with others and to drive within 24 hours of taking narcotics  ? POTENTIAL SIDE EFFECTS: Potential side effects of opioids include: nausea, vomiting, itching, dizziness, drowsiness, dry mouth, constipation, and difficulty urinating.  ? POTENTIAL ADVERSE EFFECTS:   o Opioid tolerance can develop with use of pain medications and this simply means that it requires more and more of the medication to control pain; however, this is seen more in patients that use opioids for longer periods of time.  o Opioid dependence can develop with use of Opioids and this simply means that to stop the medication can cause withdrawal symptoms; however, this is seen with patients that use Opioids for longer periods of time.  o Opioid addiction can develop with use of Opioids and the incidence of this is very unlikely in patients who take the medications as ordered and stop the medications as instructed.  o Opioid overdose can be dangerous, but is unlikely when the medication is taken as ordered and stopped when ordered. It is important not to mix opioids with alcohol or with and type of sedative such as Benadryl as this can lead to over sedation and respiratory difficulty.  ? DOSAGE:   o Pain medications will need to be taken consistently for the first week to decrease pain and promote adequate pain relief and participation in physical therapy.  o After the initial surgical pain begins to resolve, you may  begin to decrease the pain medication. By the end of 6-8 weeks, you should be off of pain medications.  o Refills will not be given by the office during evening hours, on weekends, or after 6-8 weeks post-op.  o To seek refills on pain medications during the initial 6 week post-operative period, you must call the office 48 hours in advance to request the refill. The office will then notify you when to  the prescription. DO NOT wait until you are out of the medication to request a refill.    V. FOLLOW-UP VISITS:  ? You will need to follow up in the office with your surgeon in 3 weeks. Please call this number [phone] to schedule this appointment.  If you have any concerns or suspected complications prior to your follow up visit, please call your surgeons office. Do not wait until your appointment time if you suspect complications. These will need to be addressed in the office promptly.    David Bullard PA-C  [facility]  [address] [place] [address]  [phone]    12/6/2022    CC:Candice Soriano MD:David Aguirre MD

## 2022-12-06 NOTE — H&P
Orthopaedic Surgery History and Physical    Patient Name:  Kaleigh Rizvi  YOB: 1955   Age: 67 y.o.  Medical Records Number:  [id number]    Date of Admission:  12/6/2022  6:26 AM    Chief Complaint:  Left Knee Primary Osteoarthritis    Kaleigh Rizvi is a 67 y.o. male who presents c/o severe left knee pain.  The pain has been on and off for many years, worsening to the point where the pain is becoming disabling.  The pain is a constant dull ache with occasional sharp, stabbing pain.  The patient has failed conservative treatment and would like to proceed with left total knee arthroplasty.    There were no vitals taken for this visit.    Past Medical History:    Past Medical History:   Diagnosis Date   • Acid reflux    • Arthritis    • Gout    • Hypertension    • Knee pain 11/22/2022    LEFT   • Psoriasis    • Sleep apnea     uses cpap   • Urinary frequency        Past Surgical History:   Past Surgical History:   Procedure Laterality Date   • COLONOSCOPY     • NM TOTAL KNEE ARTHROPLASTY Right 7/13/2017    Procedure: RT TOTAL KNEE ARTHROPLASTY;  Surgeon: Maikel Doshi MD;  Location: [facility];  Service: Orthopedics       Social History:    Social History     Socioeconomic History   • Marital status:    Tobacco Use   • Smoking status: Never   • Smokeless tobacco: Never   Vaping Use   • Vaping Use: Never used   Substance and Sexual Activity   • Alcohol use: No   • Drug use: No   • Sexual activity: Defer       Family History:    Family History   Problem Relation Age of Onset   • Malig Hyperthermia Neg Hx        Current Medications:  Scheduled Meds:Orthopedic surgery custom cocktail, , Injection, Once      Continuous Infusions:   PRN Meds:.    Current Facility-Administered Medications:   •  ropivacaine 0.5 % 50 mL, Morphine 5 mg, ketorolac 30 mg, EPINEPHrine 1 mg/mL 0.3 mg in sodium chloride 0.9 % 50 mL solution, , Injection, Once, David Aguirre MD    Allergies:    Allergies   Allergen  Reactions   • Sulfa Antibiotics      REACTION UNKNOWN, CHILDHOOD ALLERGY   • Adhesive Tape Rash     Coban sensitive if left in place too long; also other adhesive tapes   • Amoxicillin Rash   • Latex Rash     Glue from bandaid   • Penicillins Rash       Review of Systems:   HEENT: Patient denies any headaches, vision changes, change in hearing, or tinnitus, Patient denies any rhinorrhea,epistaxis, sinus pain, mouth or dental problems, sore throat or hoarseness, or dysphagia  Pulmonary: Patient denies any cough, congestion, SOA, or wheezing  Cardiovascular: Patient denies any chest pain, dyspnea, palpitations, weakness, intolerance of exercise, varicosities, swelling of extremities, known murmur  Gastrointestinal:  Patient denies nausea, vomiting, diarrhea, constipation, loss  of appetite, change in appetite, dysphagia, gas, heartburn, melena, change in bowel habits, use of laxatives or other drugs to alter the function of the gastrointestinal tract.  Genital/Urinary: Patient denies dysuria, change in color of urine, change in frequency of urination, pain with urgency, incontinence, retention, or nocturia.  Musculoskeletal: Patient denies increased warmth; redness; or swelling of joints; limitation of function; deformity; crepitation: pain in a joint or an extremity, the neck, or the back, especially with movement.  Neurological: Patient denies dizziness, tremor, ataxia, difficulty in speaking, change in speech, paresthesia, loss of sensation, seizures, syncope, changes in memory.  Endocrine system: Patient denies tremors, palpitations, intolerance of heat or cold, polyuria, polydipsia, polyphagia, diaphoresis, exophthalmos, or goiter.  Psychological: Patient denies thoughts/plans or harming self or other; depression,  insomnia, night terrors, fredy, memory loss, disorientation.  Skin: Patient denies any bruising, rashes, discoloration, pruritus, wounds, ulcers, decubiti, changes in the hair or nails  Hematopoietic:  Patient denies history of spontaneous or excessive bleeding, epistaxis, hematuria, melena, fatigue, enlarged or tender lymph nodes, pallor, history of anemia.        Physical Exam:   Awake, A&O x3, affect normal, no acute distress  Ambulating with a limp due to knee pain  Knee ROM is limited due to pain (5-115)  Strength is 4/5 in the quad, hamstring and calf  Cap refill is normal, Sensation intact    Card:  RR, HD Stable  Pulm:  Regular breathing, no S.O.A  Abd:  Soft, NT, ND    Lab Results (last 24 hours)     Procedure Component Value Units Date/Time    SCANNED - LABS [[id number]] Resulted: 12/06/22     Updated: 12/05/22 0930          XR Knee 1 or 2 View Left    Result Date: 11/22/2022  Narrative: XR KNEE 1 OR 2 VW LEFT-  INDICATIONS: Preoperative evaluation  TECHNIQUE: 2 views of the left knee  COMPARISON: None available  FINDINGS:  Prominent tibiofemoral joint space narrowing is seen. Mild degenerative spurring is noted. Minimal knee effusion. No acute fracture, erosion, or dislocation is identified.      Impression:  Degenerative changes of the knee.  This report was finalized on 11/22/2022 12:18 PM by Dr. Marcel Matthews M.D.      XR Chest PA & Lateral    Result Date: 11/22/2022  Narrative: XR CHEST PA AND LATERAL-  HISTORY: Male who is 67 years-old,  preoperative evaluation  TECHNIQUE: Frontal and lateral views of the chest  COMPARISON: 06/29/2017  FINDINGS: Heart, mediastinum and pulmonary vasculature are unremarkable. No focal pulmonary consolidation, pleural effusion, or pneumothorax. Old granulomatous disease is apparent. No acute osseous process.      Impression: No evidence for acute pulmonary process. Follow-up as clinically indicated.  This report was finalized on 11/22/2022 12:17 PM by Dr. Marcel Matthews M.D.          Assessment:  End-stage Primary Left Knee Osteoarthritis    Plan:  Patient's pain is becoming disabling, despite extensive conservative treatment.  Radiographs reveal end-stage  degenerative changes.  The risks of surgery, including, but not limited to, heart attack, stroke, dying, DVT, nerve injury, vascular injury, arthrofibrosis and infection were discussed.  The alternatives and benefits were also discussed.  All questions answered and the patient wishes to proceed with left total knee arthroplasty.    David Bullard PA-C  [facility]  [address] [place] [id number]  [phone]    12/6/2022    CC: Candice Soriano MD, David Aguirre MD

## 2022-12-06 NOTE — ANESTHESIA PREPROCEDURE EVALUATION
Anesthesia Evaluation     Patient summary reviewed and Nursing notes reviewed   no history of anesthetic complications:  NPO Solid Status: > 8 hours  NPO Liquid Status: > 8 hours           Airway   Mallampati: II  TM distance: >3 FB  Neck ROM: full  no difficulty expected  Dental - normal exam     Pulmonary - normal exam   (+) sleep apnea on CPAP,   Cardiovascular - normal exam    (+) hypertension,       Neuro/Psych  GI/Hepatic/Renal/Endo    (+) obesity,  GERD,      Musculoskeletal     Abdominal  - normal exam   Substance History      OB/GYN          Other   arthritis,                        Anesthesia Plan    ASA 3     general     intravenous induction     Anesthetic plan, risks, benefits, and alternatives have been provided, discussed and informed consent has been obtained with: patient.

## 2022-12-06 NOTE — PLAN OF CARE
Goal Outcome Evaluation:            Pt is s/p left TKA today with resulting impaired rom and strength of left knee, impaired endurance, impaired transfers and amb. Pt up in bathroom today via nsg asst. Pt cga for STS with rwx and vc .He amb 30' with rwx and cga antalgic gait. Hx of right TKA 5 years ago and required some cues for sequence and use of rwx today. Pt tolerated 10 reps reclined TKA ex. He has rwx and stc from prior sx and has 5 steps to enter with elsa railings. Pt plans for home tomorrow with HHPT.

## 2022-12-06 NOTE — ANESTHESIA PROCEDURE NOTES
Peripheral Block    Pre-sedation assessment completed: 12/6/2022 8:00 AM    Patient reassessed immediately prior to procedure    Patient location during procedure: pre-op  Start time: 12/6/2022 8:00 AM  Stop time: 12/6/2022 8:05 AM  Reason for block: at surgeon's request and post-op pain management  Performed by  Anesthesiologist: Faustino Ordoñez MD  Preanesthetic Checklist  Completed: patient identified, IV checked, site marked, risks and benefits discussed, surgical consent, monitors and equipment checked, pre-op evaluation and timeout performed  Prep:  Pt Position: supine  Sterile barriers:cap, gloves, mask and sterile barriers  Prep: ChloraPrep  Patient monitoring: blood pressure monitoring, continuous pulse oximetry and EKG  Procedure    Sedation: yes  Performed under: local infiltration  Guidance:ultrasound guided    ULTRASOUND INTERPRETATION.  Using ultrasound guidance a 22 G gauge needle was placed in close proximity to the nerve, at which point, under ultrasound guidance anesthetic was injected in the area of the nerve and spread of the anesthesia was seen on ultrasound in close proximity thereto.  There were no abnormalities seen on ultrasound; a digital image was taken; and the patient tolerated the procedure with no complications. Images:still images obtained, printed/placed on chart (U/S to localize the nerve)    Laterality:left  Block Type:adductor canal block  Injection Technique:single-shot  Needle Type:echogenic  Needle Gauge:22 G  Resistance on Injection: less than 15 psi    Medications Used: ropivacaine (NAROPIN) 0.5 % injection - Injection   20 mL - 12/6/2022 8:05:00 AM      Post Assessment  Injection Assessment: negative aspiration for heme, no paresthesia on injection and incremental injection  Patient Tolerance:comfortable throughout block  Complications:no

## 2022-12-06 NOTE — OP NOTE
Orthopaedic Surgery Operative Note    Patient Name:  Kaleigh Rizvi  YOB: 1955  Age: 67 y.o.  Medical Records Number:  [id number]    Date of Procedure:  12/6/2022    Pre-operative Diagnosis:  Primary Osteoarthritis Left Knee    Post-operative Diagnosis:  Primary Osteoarthritis Left Knee    Procedure Performed:  Left Total Knee Arthroplasty    Implants:  Biomet Vanguard TKA, 70 Femoral Component, 79 Tibial Tray with a 40 mm Modular Stem, 34 3-Peg Patella, 14 Standard Polyethylene Insert    Surgeon:  David Aguirre M.D.    Assistant: Pawel Okeefe (who was present during the critical portions of the case, thereby decreasing operative time and patient morbidity)    Anesthetic Type:  General    Estimated Blood Loss:  250cc's    Specimens: * No orders in the log *    No Complications      Indications for Procedure:  Kaleigh Rizvi is a 67 y.o. male suffering from end stage degenerative changes in the left knee.  The patients pain is becoming disabling, despite extensive conservative care, including NSAIDS, therapy and injections. The risks, benefits and alternatives were discussed and the patient wishes to proceed with left total knee arthroplasty.      Procedure Performed:    After informed consent was obtained, the correct patient identified, the correct operative side marked by the operative surgeon, and pre-operative IV Kefzol  given (prior to tourniquet inflation), the patient was taken to the operating room and placed supine on the operating table.  After general anesthesia was induced, a surgical time out was performed and 1 gm of Tranxemic Acid given, a well-padded tourniquet was placed and the patient's left lower extremity was prepped with chloraprep and draped in a sterile fashion.    A midline incision was made overlying the left knee and we sharply dissected down to expose the parapatellar retinaculum.  A muscle sparing, mid-vastus, parapatellar arthrotomy was performed and we elevated the  soft tissue both medially and laterally.  The menisci and ACL were excised.  We everted the patella and measured this to be 24 mm and we removed 9 mm of bone down to 15 mm.  We measured the patella to be 34 and drilled our three drill holes.  We protected the patella with the patella protector and turned our attention to the femur and the tibia.    We drilled drill holes into the femoral and the tibial intramedullary canals and proceeded to irrigate the canals to remove the fatty marrow.  We used the intramedullary donny and the 5 degree valgus cut block to make our distal femoral cut.  Once we had a smooth surface, we measured the femur to be 70.  We assured we had rotational alignment using the epicondylar axis, then we used the four-in-one cut block to make our anterior, posterior, anterior and posterior chamfer cuts.  We placed our femoral trial, had excellent fit, extended the knee and marked Webster's line on the tibia.      We then turned our attention to the tibia, where we irrigated the canal again.  We used the intramedullary donny and the 3 degree posterior sloped cut block to remove 2 mm of bone from the effected side of the tibia.  Prior to making our cut, we assured we had rotational alignment using the external guide and Webster's line.  Once we had a smooth surface, we measured the tibia to be 75.  We then removed soft tissue and bony debris from the posterior aspect of the knee.    We placed our trial implants and had excellent fit, range of motion, stability and ligament balance, throughout a complete arc of motion with a 14 std polyethylene liner.  We removed our trial implants, punched the tibial keel, copiously irrigated the knee, then cemented our implants in place using Biomet cement.  Once the cement cured, we trialed again with the 12, 14 and 16 AS, standard and posterior lipped polyethylene liners.  The 14 std gave us the best range of motion, stability and ligament balance, throughout a  "complete arc of motion.  We removed the trials, copiously irrigated the knee with dilute betadine solution, gave IV antibiotics and local anesthetic, then placed our permanent polyethylene liner.  We placed a 1/8\" hemovac drain, then closed the arthrotomy with #1 Vicryl pop-off sutures.  The subcutaneous tissue was closed with 2-0 vicryl pop-off sutures.  The skin was closed with staples.  We placed a sterile dressing of Xeroform, 4x4's, abdominal pads, cast padding and an Ace Wrap.  All sponge and needle counts were correct.  The patient was then awakened from general anesthesia and taken to the recovery room in stable condition.    The patient will be started on Aspirin 325 mg twice daily for DVT prophylaxis.  IV antibiotics will be discontinued within 24 hours of surgery.  Immediately prior to surgery, there were no acute Thromboembolic nor Cardiovascular risk factors.  An updated Medical Reconciliation form is on the chart.    David Bullard PA-C  [facility]  [address] [place] [address]  [phone]    12/6/2022        "

## 2022-12-07 VITALS
HEART RATE: 72 BPM | HEIGHT: 71 IN | SYSTOLIC BLOOD PRESSURE: 133 MMHG | RESPIRATION RATE: 16 BRPM | WEIGHT: 253.53 LBS | DIASTOLIC BLOOD PRESSURE: 72 MMHG | TEMPERATURE: 97.9 F | BODY MASS INDEX: 35.49 KG/M2 | OXYGEN SATURATION: 94 %

## 2022-12-07 LAB
ANION GAP SERPL CALCULATED.3IONS-SCNC: 13.1 MMOL/L (ref 5–15)
BUN SERPL-MCNC: 17 MG/DL (ref 8–23)
BUN/CREAT SERPL: 20.5 (ref 7–25)
CALCIUM SPEC-SCNC: 8.8 MG/DL (ref 8.6–10.5)
CHLORIDE SERPL-SCNC: 96 MMOL/L (ref 98–107)
CO2 SERPL-SCNC: 24.9 MMOL/L (ref 22–29)
CREAT SERPL-MCNC: 0.83 MG/DL (ref 0.76–1.27)
DEPRECATED RDW RBC AUTO: 41.8 FL (ref 37–54)
EGFRCR SERPLBLD CKD-EPI 2021: 97.1 ML/MIN/1.73
ERYTHROCYTE [DISTWIDTH] IN BLOOD BY AUTOMATED COUNT: 12.6 % (ref 12.3–15.4)
GLUCOSE SERPL-MCNC: 141 MG/DL (ref 65–99)
HCT VFR BLD AUTO: 32.1 % (ref 37.5–51)
HGB BLD-MCNC: 11.4 G/DL (ref 13–17.7)
MCH RBC QN AUTO: 32.8 PG (ref 26.6–33)
MCHC RBC AUTO-ENTMCNC: 35.5 G/DL (ref 31.5–35.7)
MCV RBC AUTO: 92.2 FL (ref 79–97)
PLATELET # BLD AUTO: 251 10*3/MM3 (ref 140–450)
PMV BLD AUTO: 9 FL (ref 6–12)
POTASSIUM SERPL-SCNC: 4.3 MMOL/L (ref 3.5–5.2)
RBC # BLD AUTO: 3.48 10*6/MM3 (ref 4.14–5.8)
SODIUM SERPL-SCNC: 134 MMOL/L (ref 136–145)
WBC NRBC COR # BLD: 11.01 10*3/MM3 (ref 3.4–10.8)

## 2022-12-07 PROCEDURE — 80048 BASIC METABOLIC PNL TOTAL CA: CPT | Performed by: ORTHOPAEDIC SURGERY

## 2022-12-07 PROCEDURE — G0378 HOSPITAL OBSERVATION PER HR: HCPCS

## 2022-12-07 PROCEDURE — 85027 COMPLETE CBC AUTOMATED: CPT | Performed by: ORTHOPAEDIC SURGERY

## 2022-12-07 PROCEDURE — 97116 GAIT TRAINING THERAPY: CPT

## 2022-12-07 PROCEDURE — 97110 THERAPEUTIC EXERCISES: CPT

## 2022-12-07 RX ADMIN — PANTOPRAZOLE SODIUM 40 MG: 40 TABLET, DELAYED RELEASE ORAL at 05:38

## 2022-12-07 RX ADMIN — ASPIRIN 325 MG: 325 TABLET, COATED ORAL at 09:06

## 2022-12-07 RX ADMIN — MUPIROCIN 1 APPLICATION: 20 OINTMENT TOPICAL at 09:06

## 2022-12-07 RX ADMIN — ALLOPURINOL 300 MG: 300 TABLET ORAL at 09:06

## 2022-12-07 RX ADMIN — AMLODIPINE BESYLATE 5 MG: 5 TABLET ORAL at 05:39

## 2022-12-07 RX ADMIN — FERROUS SULFATE TAB 325 MG (65 MG ELEMENTAL FE) 325 MG: 325 (65 FE) TAB at 09:05

## 2022-12-07 RX ADMIN — FINASTERIDE 5 MG: 5 TABLET, FILM COATED ORAL at 09:06

## 2022-12-07 RX ADMIN — HYDROCHLOROTHIAZIDE 12.5 MG: 12.5 TABLET ORAL at 09:05

## 2022-12-07 RX ADMIN — OXYCODONE AND ACETAMINOPHEN 1 TABLET: 5; 325 TABLET ORAL at 09:10

## 2022-12-07 RX ADMIN — CETIRIZINE HYDROCHLORIDE 10 MG: 10 TABLET ORAL at 09:06

## 2022-12-07 RX ADMIN — Medication 10 ML: at 09:09

## 2022-12-07 RX ADMIN — LISINOPRIL 20 MG: 20 TABLET ORAL at 09:05

## 2022-12-07 NOTE — PLAN OF CARE
Goal Outcome Evaluation:  Plan of Care Reviewed With: patient        Progress: improving  Outcome Evaluation: Pt tolerated treatment well this date. Increased gait distance to 220ft w/ Rw and CGA-SBA. Completed stairs w/ hand rails and CGA for safety. Pt also completed L knee exercises w/ minimal pain reported. Safe to d/c home w/ HH from PT standpoint.

## 2022-12-07 NOTE — PLAN OF CARE
Goal Outcome Evaluation:  Plan of Care Reviewed With: patient        Progress: improving  Outcome Evaluation: patient ambulating with assistance to bathroom and in hallway, voiding without difficulty, no complaints of pain at this time, plans to d/c home in am, educated on b/p monitoring

## 2022-12-07 NOTE — PROGRESS NOTES
Orthopaedic Surgery  Progress Note  12/7/2022    Patients Name:  Kaleigh Rizvi  YOB: 1955  Age: 67 y.o.  Medical Records Number:  [id number]  Date of Admission: 12/6/2022    No complaints except appropriate pain and stiffness in the left knee    Vitals:  Vitals:    12/06/22 1725 12/06/22 2131 12/07/22 0152 12/07/22 0538   BP: 122/78 122/70 126/77 115/71   BP Location: Left arm Right arm Right arm Right arm   Patient Position: Sitting Lying Lying Lying   Pulse: 70 91 76 79   Resp: 16 16 16 16   Temp: 97.4 °F (36.3 °C) 98.2 °F (36.8 °C) 98 °F (36.7 °C) 97.9 °F (36.6 °C)   TempSrc: Oral Oral Oral Oral   SpO2: 96% 93% 95% 94%   Weight:       Height:           LLE:  NVI, calf nontender, Sensation intact  No signs of DVT    Incision: clean, no signs of infection    Lab Results (last 24 hours)     Procedure Component Value Units Date/Time    Basic Metabolic Panel [[id number]]  (Abnormal) Collected: 12/07/22 0455    Specimen: Blood Updated: 12/07/22 0543     Glucose 141 mg/dL      BUN 17 mg/dL      Creatinine 0.83 mg/dL      Sodium 134 mmol/L      Potassium 4.3 mmol/L      Chloride 96 mmol/L      CO2 24.9 mmol/L      Calcium 8.8 mg/dL      BUN/Creatinine Ratio 20.5     Anion Gap 13.1 mmol/L      eGFR 97.1 mL/min/1.73      Comment: National Kidney Foundation and American Society of Nephrology (ASN) Task Force recommended calculation based on the Chronic Kidney Disease Epidemiology Collaboration (CKD-EPI) equation refit without adjustment for race.       Narrative:      GFR Normal >60  Chronic Kidney Disease <60  Kidney Failure <15      CBC (No Diff) [[id number]]  (Abnormal) Collected: 12/07/22 0455    Specimen: Blood Updated: 12/07/22 0525     WBC 11.01 10*3/mm3      RBC 3.48 10*6/mm3      Hemoglobin 11.4 g/dL      Hematocrit 32.1 %      MCV 92.2 fL      MCH 32.8 pg      MCHC 35.5 g/dL      RDW 12.6 %      RDW-SD 41.8 fl      MPV 9.0 fL      Platelets 251 10*3/mm3           XR Knee 1 or 2 View Left    Result  Date: 12/6/2022  Narrative: PROCEDURE:  XR KNEE 1 OR 2 VW LEFT-  HISTORY: Postop.  COMPARISON: Left knee radiographs 11/22/2022  FINDINGS:   2 views of the left knee were obtained. There is a new left knee total arthroplasty. There is appropriate anatomic alignment. No acute fracture is identified. There is a surgical drain terminating at the level of the distal femur. There are ventral soft tissue surgical staples. Soft tissue swelling and air are likely postoperative.  This report was finalized on 12/6/2022 11:17 AM by Dr. Irene Condon M.D.      XR Knee 1 or 2 View Left    Result Date: 11/22/2022  Narrative: XR KNEE 1 OR 2 VW LEFT-  INDICATIONS: Preoperative evaluation  TECHNIQUE: 2 views of the left knee  COMPARISON: None available  FINDINGS:  Prominent tibiofemoral joint space narrowing is seen. Mild degenerative spurring is noted. Minimal knee effusion. No acute fracture, erosion, or dislocation is identified.      Impression:  Degenerative changes of the knee.  This report was finalized on 11/22/2022 12:18 PM by Dr. Marcel Matthews M.D.      Peripheral Block    Result Date: 12/6/2022  Narrative: Faustino Ordoñez MD     12/6/2022  8:05 AM Peripheral Block Pre-sedation assessment completed: 12/6/2022 8:00 AM Patient reassessed immediately prior to procedure Patient location during procedure: pre-op Start time: 12/6/2022 8:00 AM Stop time: 12/6/2022 8:05 AM Reason for block: at surgeon's request and post-op pain management Performed by Anesthesiologist: Faustino Ordoñez MD Preanesthetic Checklist Completed: patient identified, IV checked, site marked, risks and benefits discussed, surgical consent, monitors and equipment checked, pre-op evaluation and timeout performed Prep: Pt Position: supine Sterile barriers:cap, gloves, mask and sterile barriers Prep: ChloraPrep Patient monitoring: blood pressure monitoring, continuous pulse oximetry and EKG Procedure Sedation: yes Performed under: local infiltration  Guidance:ultrasound guided ULTRASOUND INTERPRETATION.  Using ultrasound guidance a 22 G gauge needle was placed in close proximity to the nerve, at which point, under ultrasound guidance anesthetic was injected in the area of the nerve and spread of the anesthesia was seen on ultrasound in close proximity thereto.  There were no abnormalities seen on ultrasound; a digital image was taken; and the patient tolerated the procedure with no complications. Images:still images obtained, printed/placed on chart (U/S to localize the nerve) Laterality:left Block Type:adductor canal block Injection Technique:single-shot Needle Type:echogenic Needle Gauge:22 G Resistance on Injection: less than 15 psi Medications Used: ropivacaine (NAROPIN) 0.5 % injection - Injection  20 mL - 12/6/2022 8:05:00 AM Post Assessment Injection Assessment: negative aspiration for heme, no paresthesia on injection and incremental injection Patient Tolerance:comfortable throughout block Complications:no     XR Chest PA & Lateral    Result Date: 11/22/2022  Narrative: XR CHEST PA AND LATERAL-  HISTORY: Male who is 67 years-old,  preoperative evaluation  TECHNIQUE: Frontal and lateral views of the chest  COMPARISON: 06/29/2017  FINDINGS: Heart, mediastinum and pulmonary vasculature are unremarkable. No focal pulmonary consolidation, pleural effusion, or pneumothorax. Old granulomatous disease is apparent. No acute osseous process.      Impression: No evidence for acute pulmonary process. Follow-up as clinically indicated.  This report was finalized on 11/22/2022 12:17 PM by Dr. Marcel Matthews M.D.        Assesment/Plan:    Procedures:  Left TKA  Postoperative Day: 1  Weightbearing Status:  WBAT with walker  DVT Prophylaxis:  ASA for DVT prophylaxis    Disposition:  Home with home health after PT today, if comfortable and mobilizing safely    David Bullard PASARAH  [facility]  [address] [place] [address]  [id number]  [phone]    12/7/2022

## 2022-12-07 NOTE — THERAPY TREATMENT NOTE
Patient Name: Kaleigh Rizvi  : 1955    MRN: [id number]                              Today's Date: 2022       Admit Date: 2022    Visit Dx:     ICD-10-CM ICD-9-CM   1. Primary osteoarthritis of left knee  M17.12 715.16     Patient Active Problem List   Diagnosis   • Osteoarthritis, knee   • Primary osteoarthritis of left knee     Past Medical History:   Diagnosis Date   • Acid reflux    • Arthritis    • Gout    • Hypertension    • Knee pain 2022    LEFT   • Psoriasis    • Sleep apnea     uses cpap   • Urinary frequency      Past Surgical History:   Procedure Laterality Date   • COLONOSCOPY     • IN TOTAL KNEE ARTHROPLASTY Right 2017    Procedure: RT TOTAL KNEE ARTHROPLASTY;  Surgeon: Maikel Doshi MD;  Location: [facility] OR;  Service: Orthopedics   • TOTAL KNEE ARTHROPLASTY Left 2022    Procedure: LEFT TOTAL KNEE ARTHROPLASTY;  Surgeon: David Aguirre MD;  Location: [facility];  Service: Orthopedics;  Laterality: Left;      General Information     Row Name 22 1546          Physical Therapy Time and Intention    Document Type therapy note (daily note)  -     Mode of Treatment physical therapy  -     Row Name 22 1546          General Information    Existing Precautions/Restrictions fall  -     Row Name 22 1546          Cognition    Orientation Status (Cognition) oriented x 4  -           User Key  (r) = Recorded By, (t) = Taken By, (c) = Cosigned By    Initials Name Provider Type     Martha Elizabeth PTA Physical Therapist Assistant               Mobility     Row Name 22 1546          Bed Mobility    Comment, (Bed Mobility) up in chair  -     Row Name 22 1546          Sit-Stand Transfer    Sit-Stand [place] (Transfers) standby assist  [facility]     Assistive Device (Sit-Stand Transfers) walker, front-wheeled  -     Row Name 22 1546          Gait/Stairs (Locomotion)    [place] Level (Gait) standby assist  -      Assistive Device (Gait) walker, front-wheeled  -     Distance in Feet (Gait) 220  -SM     Deviations/Abnormal Patterns (Gait) antalgic;gala decreased;stride length decreased  -     Bilateral Gait Deviations forward flexed posture  -SM     Left Sided Gait Deviations weight shift ability decreased  -SM     [place] Level (Stairs) contact guard;stand by assist  -     Handrail Location (Stairs) both sides  -SM     Number of Steps (Stairs) 4  -SM     Ascending Technique (Stairs) step-to-step  -SM     Descending Technique (Stairs) step-to-step  -SM           User Key  (r) = Recorded By, (t) = Taken By, (c) = Cosigned By    Initials Name Provider Type    Martha Rojas PTA Physical Therapist Assistant               Obj/Interventions     Row Name 12/07/22 1548          Motor Skills    Therapeutic Exercise --  L TKR protocol x10 reps  -           User Key  (r) = Recorded By, (t) = Taken By, (c) = Cosigned By    Initials Name Provider Type    Martha Rojas PTA Physical Therapist Assistant               Goals/Plan    No documentation.                Clinical Impression     Row Name 12/07/22 1548          Pain    Pretreatment Pain Rating 3/10  -SM     Posttreatment Pain Rating 5/10  -SM     Pain Location - Side/Orientation Left  -     Pain Location - knee  -SM     Pain Intervention(s) Repositioned;Ambulation/increased activity;Rest  -     Row Name 12/07/22 1548          Positioning and Restraints    Pre-Treatment Position sitting in chair/recliner  -     Post Treatment Position chair  -SM     In Chair reclined;call light within reach;encouraged to call for assist;notified nsg  -           User Key  (r) = Recorded By, (t) = Taken By, (c) = Cosigned By    Initials Name Provider Type    Martha Rojas PTA Physical Therapist Assistant               Outcome Measures     Row Name 12/07/22 1549 12/07/22 0800       How much help from another person do you currently need...    Turning  from your back to your side while in flat bed without using bedrails? 4  -SM 4  -CS    Moving from lying on back to sitting on the side of a flat bed without bedrails? 4  -SM 4  -CS    Moving to and from a bed to a chair (including a wheelchair)? 4  -SM 3  -CS    Standing up from a chair using your arms (e.g., wheelchair, bedside chair)? 4  -SM 3  -CS    Climbing 3-5 steps with a railing? 3  -SM 3  -CS    To walk in hospital room? 4  -SM 3  -CS    AM-PAC 6 Clicks Score (PT) 23  -SM 20  -CS    Highest level of mobility 7 --> Walked 25 feet or more  -SM 6 --> Walked 10 steps or more  -CS    Row Name 12/07/22 1549          Functional Assessment    Outcome Measure Options AM-PAC 6 Clicks Basic Mobility (PT)  -           User Key  (r) = Recorded By, (t) = Taken By, (c) = Cosigned By    Initials Name Provider Type     Martha Elizabeth PTA Physical Therapist Assistant     Sepideh Miller, RN Registered Nurse                             Physical Therapy Education     Title: PT OT SLP Therapies (Done)     Topic: Physical Therapy (Done)     Point: Mobility training (Done)     Learning Progress Summary           Patient Acceptance, E,TB,D, VU,NR by  at 12/7/2022 1549    Eager, E, VU,NR by  at 12/6/2022 1657                   Point: Home exercise program (Done)     Learning Progress Summary           Patient Acceptance, E,TB,D, VU,NR by  at 12/7/2022 1549    Eager, E, VU,NR by  at 12/6/2022 1657                   Point: Body mechanics (Resolved)     Learner Progress:  Not documented in this visit.          Point: Precautions (Resolved)     Learner Progress:  Not documented in this visit.                      User Key     Initials Effective Dates Name Provider Type Discipline     06/16/21 -  Raisa Foss PT Physical Therapist PT     03/07/18 -  Martha Elizabeth PTA Physical Therapist Assistant PT              PT Recommendation and Plan     Plan of Care Reviewed With: patient  Progress:  improving  Outcome Evaluation: Pt tolerated treatment well this date. Increased gait distance to 220ft w/ Rw and CGA-SBA. Completed stairs w/ hand rails and CGA for safety. Pt also completed L knee exercises w/ minimal pain reported. Safe to d/c home w/ HH from PT standpoint.     Time Calculation:    PT Charges     Row Name 12/07/22 1554             Time Calculation    Start Time 0900  -      Stop Time 0940  -      Time Calculation (min) 40 min  -      PT Received On 12/07/22  -      PT - Next Appointment 12/08/22  -            User Key  (r) = Recorded By, (t) = Taken By, (c) = Cosigned By    Initials Name Provider Type     Martha Elizabeth PTA Physical Therapist Assistant              Therapy Charges for Today     Code Description Service Date Service Provider Modifiers Qty    91652637689 HC PT THER PROC EA 15 MIN 12/7/2022 Martha Elizabeth PTA GP 2    48239565258 HC GAIT TRAINING EA 15 MIN 12/7/2022 Martha Elizabeth PTA GP 1          PT G-Codes  Outcome Measure Options: AM-PAC 6 Clicks Basic Mobility (PT)  AM-PAC 6 Clicks Score (PT): 23  PT Discharge Summary  Anticipated Discharge Disposition (PT): home with home health    Martha Elizabeth PTA  12/7/2022

## 2022-12-07 NOTE — PLAN OF CARE
Goal Outcome Evaluation:              Outcome Evaluation: Patient for dicsharge to home with HH, stable condition, dressing changed,needs attended.

## (undated) DEVICE — PIN TROC SIGNATURE PK/2

## (undated) DEVICE — NEEDLE, QUINCKE, 20GX3.5": Brand: MEDLINE

## (undated) DEVICE — STPLR SKIN VISISTAT WD 35CT

## (undated) DEVICE — PK KN TOTL 40

## (undated) DEVICE — DRSNG GZ PETROLTM XEROFORM CURAD 1X8IN STRL

## (undated) DEVICE — PENCL ES MEGADINE EZ/CLEAN BUTN W/HOLSTR 10FT

## (undated) DEVICE — BNDG ELAS ELITE V/CLOSE 4IN 5YD LF STRL

## (undated) DEVICE — APPL CHLORAPREP HI/LITE 26ML ORNG

## (undated) DEVICE — NDL SPINE 20G 3 1/2 YEL STRL 1P/U

## (undated) DEVICE — DUAL CUT SAGITTAL BLADE

## (undated) DEVICE — CONTAINER,SPECIMEN,OR STERILE,4OZ: Brand: MEDLINE

## (undated) DEVICE — UNDERCAST PADDING: Brand: DEROYAL

## (undated) DEVICE — DRSNG WND GZ CURAD OIL EMULSION 3X8IN LF STRL 1PK

## (undated) DEVICE — DRAPE,REIN 53X77,STERILE: Brand: MEDLINE

## (undated) DEVICE — SYRINGE, LUER LOCK, 60ML: Brand: MEDLINE

## (undated) DEVICE — PREMIUM WET SKIN PREP TRAY: Brand: MEDLINE INDUSTRIES, INC.

## (undated) DEVICE — GLV SURG TRIUMPH CLASSIC PF LTX 8.5 STRL

## (undated) DEVICE — BANDAGE,GAUZE,BULKEE II,4.5"X4.1YD,STRL: Brand: MEDLINE

## (undated) DEVICE — PAD,ABDOMINAL,8"X10",ST,LF: Brand: MEDLINE

## (undated) DEVICE — APPL DURAPREP IODOPHOR APL 26ML

## (undated) DEVICE — DECANTER BAG 9": Brand: MEDLINE INDUSTRIES, INC.

## (undated) DEVICE — SYR LUERLOK 20CC

## (undated) DEVICE — ANTIBACTERIAL UNDYED BRAIDED (POLYGLACTIN 910), SYNTHETIC ABSORBABLE SUTURE: Brand: COATED VICRYL

## (undated) DEVICE — ENCORE® LATEX ORTHO SIZE 8.5, STERILE LATEX POWDER-FREE SURGICAL GLOVE: Brand: ENCORE

## (undated) DEVICE — SOL NACL 0.9PCT 1000ML

## (undated) DEVICE — GLV SURG PREMIERPRO ORTHO LTX PF SZ7.5 BRN

## (undated) DEVICE — KT DRN EVAC WND PVC PCH WTROC RND 10F400

## (undated) DEVICE — GAUZE,SPONGE,FLUFF,6"X6.75",STRL,10/TRAY: Brand: MEDLINE

## (undated) DEVICE — GLV SURG BIOGEL LTX PF 7 1/2